# Patient Record
Sex: FEMALE | Race: WHITE | NOT HISPANIC OR LATINO | Employment: UNEMPLOYED | URBAN - METROPOLITAN AREA
[De-identification: names, ages, dates, MRNs, and addresses within clinical notes are randomized per-mention and may not be internally consistent; named-entity substitution may affect disease eponyms.]

---

## 2017-09-28 ENCOUNTER — CONVERSION ENCOUNTER (OUTPATIENT)
Dept: MAMMOGRAPHY | Facility: HOSPITAL | Age: 57
End: 2017-09-28

## 2018-11-16 ENCOUNTER — CONVERSION ENCOUNTER (OUTPATIENT)
Dept: MAMMOGRAPHY | Facility: HOSPITAL | Age: 58
End: 2018-11-16

## 2019-01-02 ENCOUNTER — HOSPITAL ENCOUNTER (OUTPATIENT)
Dept: FAMILY MEDICINE CLINIC | Facility: CLINIC | Age: 59
Discharge: HOME | End: 2019-01-02
Attending: FAMILY MEDICINE

## 2019-01-02 LAB
ALBUMIN SERPL-MCNC: 3.8 G/DL (ref 3.5–5)
ALBUMIN/GLOB SERPL: 1.3 {RATIO} (ref 1.4–2.6)
ALP SERPL-CCNC: 47 U/L (ref 53–141)
ALT SERPL-CCNC: 16 U/L (ref 10–40)
ANION GAP SERPL CALC-SCNC: 16 MMOL/L (ref 8–19)
AST SERPL-CCNC: 17 U/L (ref 15–50)
BILIRUB SERPL-MCNC: 0.23 MG/DL (ref 0.2–1.3)
BUN SERPL-MCNC: 11 MG/DL (ref 5–25)
BUN/CREAT SERPL: 18 {RATIO} (ref 6–20)
CALCIUM SERPL-MCNC: 8.9 MG/DL (ref 8.7–10.4)
CHLORIDE SERPL-SCNC: 99 MMOL/L (ref 99–111)
CONV CO2: 23 MMOL/L (ref 22–32)
CONV TOTAL PROTEIN: 6.8 G/DL (ref 6.3–8.2)
CREAT UR-MCNC: 0.61 MG/DL (ref 0.5–0.9)
GFR SERPLBLD BASED ON 1.73 SQ M-ARVRAT: >60 ML/MIN/{1.73_M2}
GLOBULIN UR ELPH-MCNC: 3 G/DL (ref 2–3.5)
GLUCOSE SERPL-MCNC: 65 MG/DL (ref 65–99)
OSMOLALITY SERPL CALC.SUM OF ELEC: 274 MOSM/KG (ref 273–304)
POTASSIUM SERPL-SCNC: 5.2 MMOL/L (ref 3.5–5.3)
SODIUM SERPL-SCNC: 133 MMOL/L (ref 135–147)

## 2019-02-21 ENCOUNTER — HOSPITAL ENCOUNTER (OUTPATIENT)
Dept: OTHER | Facility: HOSPITAL | Age: 59
Discharge: HOME | End: 2019-02-21
Attending: ORTHOPAEDIC SURGERY

## 2019-02-21 LAB
ALBUMIN SERPL-MCNC: 3.9 G/DL (ref 3.5–5)
ALBUMIN/GLOB SERPL: 1.3 {RATIO} (ref 1.4–2.6)
ALP SERPL-CCNC: 47 U/L (ref 53–141)
ALT SERPL-CCNC: 13 U/L (ref 10–40)
ANION GAP SERPL CALC-SCNC: 19 MMOL/L (ref 8–19)
AST SERPL-CCNC: 16 U/L (ref 15–50)
BASOPHILS # BLD AUTO: 0.02 10*3/UL (ref 0–0.2)
BASOPHILS NFR BLD AUTO: 0.4 % (ref 0–3)
BILIRUB SERPL-MCNC: 0.3 MG/DL (ref 0.2–1.3)
BUN SERPL-MCNC: 12 MG/DL (ref 5–25)
BUN/CREAT SERPL: 17 {RATIO} (ref 6–20)
CALCIUM SERPL-MCNC: 9.1 MG/DL (ref 8.7–10.4)
CHLORIDE SERPL-SCNC: 98 MMOL/L (ref 99–111)
CONV ABS IMM GRAN: 0.01 10*3/UL (ref 0–0.2)
CONV CO2: 20 MMOL/L (ref 22–32)
CONV IMMATURE GRAN: 0.2 % (ref 0–1.8)
CONV TOTAL PROTEIN: 7 G/DL (ref 6.3–8.2)
CREAT UR-MCNC: 0.69 MG/DL (ref 0.5–0.9)
DEPRECATED RDW RBC AUTO: 57 FL (ref 36.4–46.3)
EOSINOPHIL # BLD AUTO: 0.31 10*3/UL (ref 0–0.7)
EOSINOPHIL # BLD AUTO: 6 % (ref 0–7)
ERYTHROCYTE [DISTWIDTH] IN BLOOD BY AUTOMATED COUNT: 15.6 % (ref 11.7–14.4)
GFR SERPLBLD BASED ON 1.73 SQ M-ARVRAT: >60 ML/MIN/{1.73_M2}
GLOBULIN UR ELPH-MCNC: 3.1 G/DL (ref 2–3.5)
GLUCOSE SERPL-MCNC: 133 MG/DL (ref 65–99)
HBA1C MFR BLD: 13.9 G/DL (ref 12–16)
HCT VFR BLD AUTO: 42 % (ref 37–47)
LYMPHOCYTES # BLD AUTO: 1.71 10*3/UL (ref 1–5)
MCH RBC QN AUTO: 32.6 PG (ref 27–31)
MCHC RBC AUTO-ENTMCNC: 33.1 G/DL (ref 33–37)
MCV RBC AUTO: 98.6 FL (ref 81–99)
MONOCYTES # BLD AUTO: 0.67 10*3/UL (ref 0.2–1.2)
MONOCYTES NFR BLD AUTO: 13 % (ref 3–10)
NEUTROPHILS # BLD AUTO: 2.44 10*3/UL (ref 2–8)
NEUTROPHILS NFR BLD AUTO: 47.3 % (ref 30–85)
NRBC CBCN: 0 % (ref 0–0.7)
OSMOLALITY SERPL CALC.SUM OF ELEC: 276 MOSM/KG (ref 273–304)
PLATELET # BLD AUTO: 286 10*3/UL (ref 130–400)
PMV BLD AUTO: 10.2 FL (ref 9.4–12.3)
POTASSIUM SERPL-SCNC: 5.2 MMOL/L (ref 3.5–5.3)
RBC # BLD AUTO: 4.26 10*6/UL (ref 4.2–5.4)
SODIUM SERPL-SCNC: 132 MMOL/L (ref 135–147)
VARIANT LYMPHS NFR BLD MANUAL: 33.1 % (ref 20–45)
WBC # BLD AUTO: 5.16 10*3/UL (ref 4.8–10.8)

## 2019-03-25 ENCOUNTER — HOSPITAL ENCOUNTER (OUTPATIENT)
Dept: OTHER | Facility: HOSPITAL | Age: 59
Discharge: HOME | End: 2019-03-25
Attending: OBSTETRICS & GYNECOLOGY

## 2019-03-25 LAB
BASOPHILS # BLD AUTO: 0.04 10*3/UL (ref 0–0.2)
BASOPHILS NFR BLD AUTO: 0.9 % (ref 0–3)
CONV ABS IMM GRAN: 0.01 10*3/UL (ref 0–0.2)
CONV IMMATURE GRAN: 0.2 % (ref 0–1.8)
DEPRECATED RDW RBC AUTO: 50.4 FL (ref 36.4–46.3)
EOSINOPHIL # BLD AUTO: 0.32 10*3/UL (ref 0–0.7)
EOSINOPHIL # BLD AUTO: 7.2 % (ref 0–7)
ERYTHROCYTE [DISTWIDTH] IN BLOOD BY AUTOMATED COUNT: 14.1 % (ref 11.7–14.4)
FSH SERPL-ACNC: 0.1 M[IU]/ML
HBA1C MFR BLD: 13.8 G/DL (ref 12–16)
HCT VFR BLD AUTO: 41.3 % (ref 37–47)
LH SERPL-ACNC: 0.1 M[IU]/ML
LYMPHOCYTES # BLD AUTO: 1.33 10*3/UL (ref 1–5)
MCH RBC QN AUTO: 32.5 PG (ref 27–31)
MCHC RBC AUTO-ENTMCNC: 33.4 G/DL (ref 33–37)
MCV RBC AUTO: 97.4 FL (ref 81–99)
MONOCYTES # BLD AUTO: 0.67 10*3/UL (ref 0.2–1.2)
MONOCYTES NFR BLD AUTO: 15.1 % (ref 3–10)
NEUTROPHILS # BLD AUTO: 2.07 10*3/UL (ref 2–8)
NEUTROPHILS NFR BLD AUTO: 46.6 % (ref 30–85)
NRBC CBCN: 0 % (ref 0–0.7)
PLATELET # BLD AUTO: 267 10*3/UL (ref 130–400)
PMV BLD AUTO: 9.8 FL (ref 9.4–12.3)
RBC # BLD AUTO: 4.24 10*6/UL (ref 4.2–5.4)
T4 FREE SERPL-MCNC: 1.4 NG/DL (ref 0.9–1.8)
TSH SERPL-ACNC: 1.1 M[IU]/L (ref 0.27–4.2)
VARIANT LYMPHS NFR BLD MANUAL: 30 % (ref 20–45)
WBC # BLD AUTO: 4.44 10*3/UL (ref 4.8–10.8)

## 2019-07-26 ENCOUNTER — HOSPITAL ENCOUNTER (OUTPATIENT)
Dept: OTHER | Facility: HOSPITAL | Age: 59
Discharge: HOME | End: 2019-07-26
Attending: OBSTETRICS & GYNECOLOGY

## 2019-07-26 LAB
APPEARANCE UR: CLEAR
BILIRUB UR QL: NEGATIVE
COLOR UR: YELLOW
CONV COLLECTION SOURCE (UA): NORMAL
CONV UROBILINOGEN IN URINE BY AUTOMATED TEST STRIP: 0.2 {EHRLICHU}/DL (ref 0.1–1)
GLUCOSE UR QL: NEGATIVE MG/DL
HGB UR QL STRIP: NEGATIVE
KETONES UR QL STRIP: NEGATIVE MG/DL
LEUKOCYTE ESTERASE UR QL STRIP: NEGATIVE
NITRITE UR QL STRIP: NEGATIVE
PH UR STRIP.AUTO: 5 [PH] (ref 5–8)
PROT UR QL: NEGATIVE MG/DL
SP GR UR: 1.01 (ref 1–1.03)

## 2019-07-29 LAB
AMOXICILLIN+CLAV SUSC ISLT: 8
AMPICILLIN SUSC ISLT: >=32
AMPICILLIN+SULBAC SUSC ISLT: >=32
BACTERIA UR CULT: ABNORMAL
CEFAZOLIN SUSC ISLT: <=4
CEFEPIME SUSC ISLT: <=1
CEFTAZIDIME SUSC ISLT: <=1
CEFTRIAXONE SUSC ISLT: <=1
CEFUROXIME ORAL SUSC ISLT: 4
CEFUROXIME PARENTER SUSC ISLT: 4
CIPROFLOXACIN SUSC ISLT: <=0.25
ERTAPENEM SUSC ISLT: <=0.5
GENTAMICIN SUSC ISLT: >=16
LEVOFLOXACIN SUSC ISLT: <=0.12
NITROFURANTOIN SUSC ISLT: <=16
TETRACYCLINE SUSC ISLT: <=1
TMP SMX SUSC ISLT: >=320
TOBRAMYCIN SUSC ISLT: 2

## 2019-10-31 ENCOUNTER — HOSPITAL ENCOUNTER (OUTPATIENT)
Dept: FAMILY MEDICINE CLINIC | Facility: CLINIC | Age: 59
Discharge: HOME | End: 2019-10-31
Attending: NURSE PRACTITIONER

## 2019-10-31 ENCOUNTER — OFFICE VISIT CONVERTED (OUTPATIENT)
Dept: FAMILY MEDICINE CLINIC | Facility: CLINIC | Age: 59
End: 2019-10-31
Attending: NURSE PRACTITIONER

## 2019-10-31 ENCOUNTER — CONVERSION ENCOUNTER (OUTPATIENT)
Dept: FAMILY MEDICINE CLINIC | Facility: CLINIC | Age: 59
End: 2019-10-31

## 2019-10-31 ENCOUNTER — HOSPITAL ENCOUNTER (OUTPATIENT)
Dept: GENERAL RADIOLOGY | Facility: HOSPITAL | Age: 59
Discharge: HOME | End: 2019-10-31
Attending: NURSE PRACTITIONER

## 2019-10-31 LAB
ALBUMIN SERPL-MCNC: 4 G/DL (ref 3.5–5)
ALBUMIN/GLOB SERPL: 1.2 {RATIO} (ref 1.4–2.6)
ALP SERPL-CCNC: 48 U/L (ref 53–141)
ALT SERPL-CCNC: 15 U/L (ref 10–40)
AMYLASE SERPL-CCNC: 67 U/L (ref 30–110)
ANION GAP SERPL CALC-SCNC: 16 MMOL/L (ref 8–19)
AST SERPL-CCNC: 19 U/L (ref 15–50)
BASOPHILS # BLD AUTO: 0.03 10*3/UL (ref 0–0.2)
BASOPHILS NFR BLD AUTO: 0.4 % (ref 0–3)
BILIRUB SERPL-MCNC: 0.34 MG/DL (ref 0.2–1.3)
BUN SERPL-MCNC: 8 MG/DL (ref 5–25)
BUN/CREAT SERPL: 12 {RATIO} (ref 6–20)
CALCIUM SERPL-MCNC: 9 MG/DL (ref 8.7–10.4)
CHLORIDE SERPL-SCNC: 93 MMOL/L (ref 99–111)
CONV ABS IMM GRAN: 0.02 10*3/UL (ref 0–0.2)
CONV CO2: 24 MMOL/L (ref 22–32)
CONV IMMATURE GRAN: 0.2 % (ref 0–1.8)
CONV TOTAL PROTEIN: 7.3 G/DL (ref 6.3–8.2)
CREAT UR-MCNC: 0.65 MG/DL (ref 0.5–0.9)
DEPRECATED RDW RBC AUTO: 50.7 FL (ref 36.4–46.3)
EOSINOPHIL # BLD AUTO: 0.12 10*3/UL (ref 0–0.7)
EOSINOPHIL # BLD AUTO: 1.5 % (ref 0–7)
ERYTHROCYTE [DISTWIDTH] IN BLOOD BY AUTOMATED COUNT: 13.3 % (ref 11.7–14.4)
GFR SERPLBLD BASED ON 1.73 SQ M-ARVRAT: >60 ML/MIN/{1.73_M2}
GLOBULIN UR ELPH-MCNC: 3.3 G/DL (ref 2–3.5)
GLUCOSE SERPL-MCNC: 82 MG/DL (ref 65–99)
HCT VFR BLD AUTO: 44.5 % (ref 37–47)
HGB BLD-MCNC: 14.8 G/DL (ref 12–16)
LIPASE SERPL-CCNC: 28 U/L (ref 5–51)
LYMPHOCYTES # BLD AUTO: 2.08 10*3/UL (ref 1–5)
LYMPHOCYTES NFR BLD AUTO: 25.9 % (ref 20–45)
MCH RBC QN AUTO: 33.6 PG (ref 27–31)
MCHC RBC AUTO-ENTMCNC: 33.3 G/DL (ref 33–37)
MCV RBC AUTO: 100.9 FL (ref 81–99)
MONOCYTES # BLD AUTO: 0.77 10*3/UL (ref 0.2–1.2)
MONOCYTES NFR BLD AUTO: 9.6 % (ref 3–10)
NEUTROPHILS # BLD AUTO: 5 10*3/UL (ref 2–8)
NEUTROPHILS NFR BLD AUTO: 62.4 % (ref 30–85)
NRBC CBCN: 0 % (ref 0–0.7)
OSMOLALITY SERPL CALC.SUM OF ELEC: 265 MOSM/KG (ref 273–304)
PLATELET # BLD AUTO: 310 10*3/UL (ref 130–400)
PMV BLD AUTO: 10.8 FL (ref 9.4–12.3)
POTASSIUM SERPL-SCNC: 4 MMOL/L (ref 3.5–5.3)
RBC # BLD AUTO: 4.41 10*6/UL (ref 4.2–5.4)
SODIUM SERPL-SCNC: 129 MMOL/L (ref 135–147)
WBC # BLD AUTO: 8.02 10*3/UL (ref 4.8–10.8)

## 2019-11-01 ENCOUNTER — HOSPITAL ENCOUNTER (OUTPATIENT)
Dept: GENERAL RADIOLOGY | Facility: HOSPITAL | Age: 59
Discharge: HOME | End: 2019-11-01
Attending: NURSE PRACTITIONER

## 2019-12-04 ENCOUNTER — HOSPITAL ENCOUNTER (OUTPATIENT)
Dept: GENERAL RADIOLOGY | Facility: HOSPITAL | Age: 59
Discharge: HOME | End: 2019-12-04
Attending: NURSE PRACTITIONER

## 2019-12-17 ENCOUNTER — HOSPITAL ENCOUNTER (OUTPATIENT)
Dept: PERIOP | Facility: HOSPITAL | Age: 59
Setting detail: HOSPITAL OUTPATIENT SURGERY
Discharge: HOME | End: 2019-12-18
Attending: OBSTETRICS & GYNECOLOGY

## 2019-12-17 LAB
BASOPHILS # BLD AUTO: 0.04 10*3/UL (ref 0–0.2)
BASOPHILS NFR BLD AUTO: 0.8 % (ref 0–3)
CONV ABS IMM GRAN: 0.01 10*3/UL (ref 0–0.2)
CONV IMMATURE GRAN: 0.2 % (ref 0–1.8)
DEPRECATED RDW RBC AUTO: 49.7 FL (ref 36.4–46.3)
EOSINOPHIL # BLD AUTO: 0.18 10*3/UL (ref 0–0.7)
EOSINOPHIL # BLD AUTO: 3.8 % (ref 0–7)
ERYTHROCYTE [DISTWIDTH] IN BLOOD BY AUTOMATED COUNT: 13.6 % (ref 11.7–14.4)
HCT VFR BLD AUTO: 44 % (ref 37–47)
HGB BLD-MCNC: 15.1 G/DL (ref 12–16)
LYMPHOCYTES # BLD AUTO: 1.52 10*3/UL (ref 1–5)
LYMPHOCYTES NFR BLD AUTO: 31.9 % (ref 20–45)
MCH RBC QN AUTO: 33.7 PG (ref 27–31)
MCHC RBC AUTO-ENTMCNC: 34.3 G/DL (ref 33–37)
MCV RBC AUTO: 98.2 FL (ref 81–99)
MONOCYTES # BLD AUTO: 0.59 10*3/UL (ref 0.2–1.2)
MONOCYTES NFR BLD AUTO: 12.4 % (ref 3–10)
NEUTROPHILS # BLD AUTO: 2.42 10*3/UL (ref 2–8)
NEUTROPHILS NFR BLD AUTO: 50.9 % (ref 30–85)
NRBC CBCN: 0 % (ref 0–0.7)
PLATELET # BLD AUTO: 281 10*3/UL (ref 130–400)
PMV BLD AUTO: 9.5 FL (ref 9.4–12.3)
RBC # BLD AUTO: 4.48 10*6/UL (ref 4.2–5.4)
WBC # BLD AUTO: 4.76 10*3/UL (ref 4.8–10.8)

## 2019-12-18 LAB
BASOPHILS # BLD AUTO: 0.05 10*3/UL (ref 0–0.2)
BASOPHILS NFR BLD AUTO: 0.5 % (ref 0–3)
CONV ABS IMM GRAN: 0.05 10*3/UL (ref 0–0.2)
CONV IMMATURE GRAN: 0.5 % (ref 0–1.8)
DEPRECATED RDW RBC AUTO: 48.9 FL (ref 36.4–46.3)
EOSINOPHIL # BLD AUTO: 0.2 10*3/UL (ref 0–0.7)
EOSINOPHIL # BLD AUTO: 2 % (ref 0–7)
ERYTHROCYTE [DISTWIDTH] IN BLOOD BY AUTOMATED COUNT: 13.6 % (ref 11.7–14.4)
HCT VFR BLD AUTO: 37.1 % (ref 37–47)
HGB BLD-MCNC: 12.8 G/DL (ref 12–16)
LYMPHOCYTES # BLD AUTO: 2.29 10*3/UL (ref 1–5)
LYMPHOCYTES NFR BLD AUTO: 22.4 % (ref 20–45)
MCH RBC QN AUTO: 33.7 PG (ref 27–31)
MCHC RBC AUTO-ENTMCNC: 34.5 G/DL (ref 33–37)
MCV RBC AUTO: 97.6 FL (ref 81–99)
MONOCYTES # BLD AUTO: 1.14 10*3/UL (ref 0.2–1.2)
MONOCYTES NFR BLD AUTO: 11.2 % (ref 3–10)
NEUTROPHILS # BLD AUTO: 6.48 10*3/UL (ref 2–8)
NEUTROPHILS NFR BLD AUTO: 63.4 % (ref 30–85)
NRBC CBCN: 0 % (ref 0–0.7)
PLATELET # BLD AUTO: 253 10*3/UL (ref 130–400)
PMV BLD AUTO: 9.4 FL (ref 9.4–12.3)
RBC # BLD AUTO: 3.8 10*6/UL (ref 4.2–5.4)
WBC # BLD AUTO: 10.21 10*3/UL (ref 4.8–10.8)

## 2020-01-21 ENCOUNTER — OFFICE VISIT CONVERTED (OUTPATIENT)
Dept: FAMILY MEDICINE CLINIC | Facility: CLINIC | Age: 60
End: 2020-01-21
Attending: NURSE PRACTITIONER

## 2020-01-24 ENCOUNTER — OFFICE VISIT CONVERTED (OUTPATIENT)
Dept: FAMILY MEDICINE CLINIC | Facility: CLINIC | Age: 60
End: 2020-01-24
Attending: NURSE PRACTITIONER

## 2020-06-10 ENCOUNTER — TELEPHONE CONVERTED (OUTPATIENT)
Dept: GASTROENTEROLOGY | Facility: CLINIC | Age: 60
End: 2020-06-10
Attending: PHYSICIAN ASSISTANT

## 2020-07-22 ENCOUNTER — OFFICE VISIT CONVERTED (OUTPATIENT)
Dept: GASTROENTEROLOGY | Facility: CLINIC | Age: 60
End: 2020-07-22
Attending: PHYSICIAN ASSISTANT

## 2020-07-24 ENCOUNTER — HOSPITAL ENCOUNTER (OUTPATIENT)
Dept: FAMILY MEDICINE CLINIC | Facility: CLINIC | Age: 60
Discharge: HOME | End: 2020-07-24
Attending: FAMILY MEDICINE

## 2020-07-24 LAB
25(OH)D3 SERPL-MCNC: 28.2 NG/ML (ref 30–100)
ALBUMIN SERPL-MCNC: 3.8 G/DL (ref 3.5–5)
ALBUMIN/GLOB SERPL: 1.2 {RATIO} (ref 1.4–2.6)
ALP SERPL-CCNC: 78 U/L (ref 53–141)
ALT SERPL-CCNC: 20 U/L (ref 10–40)
AMYLASE SERPL-CCNC: 64 U/L (ref 30–110)
ANION GAP SERPL CALC-SCNC: 16 MMOL/L (ref 8–19)
AST SERPL-CCNC: 20 U/L (ref 15–50)
BASOPHILS # BLD AUTO: 0.03 10*3/UL (ref 0–0.2)
BASOPHILS NFR BLD AUTO: 0.7 % (ref 0–3)
BILIRUB SERPL-MCNC: 0.51 MG/DL (ref 0.2–1.3)
BUN SERPL-MCNC: 14 MG/DL (ref 5–25)
BUN/CREAT SERPL: 24 {RATIO} (ref 6–20)
CALCIUM SERPL-MCNC: 9.5 MG/DL (ref 8.7–10.4)
CHLORIDE SERPL-SCNC: 99 MMOL/L (ref 99–111)
CONV ABS IMM GRAN: 0.01 10*3/UL (ref 0–0.2)
CONV CO2: 26 MMOL/L (ref 22–32)
CONV IMMATURE GRAN: 0.2 % (ref 0–1.8)
CONV TOTAL PROTEIN: 7 G/DL (ref 6.3–8.2)
CREAT UR-MCNC: 0.59 MG/DL (ref 0.5–0.9)
DEPRECATED RDW RBC AUTO: 50.3 FL (ref 36.4–46.3)
EOSINOPHIL # BLD AUTO: 0.2 10*3/UL (ref 0–0.7)
EOSINOPHIL # BLD AUTO: 4.7 % (ref 0–7)
ERYTHROCYTE [DISTWIDTH] IN BLOOD BY AUTOMATED COUNT: 13.2 % (ref 11.7–14.4)
GFR SERPLBLD BASED ON 1.73 SQ M-ARVRAT: >60 ML/MIN/{1.73_M2}
GLOBULIN UR ELPH-MCNC: 3.2 G/DL (ref 2–3.5)
GLUCOSE SERPL-MCNC: 75 MG/DL (ref 65–99)
HCT VFR BLD AUTO: 45 % (ref 37–47)
HGB BLD-MCNC: 14.4 G/DL (ref 12–16)
LIPASE SERPL-CCNC: 19 U/L (ref 5–51)
LYMPHOCYTES # BLD AUTO: 1.39 10*3/UL (ref 1–5)
LYMPHOCYTES NFR BLD AUTO: 32.5 % (ref 20–45)
MCH RBC QN AUTO: 32.7 PG (ref 27–31)
MCHC RBC AUTO-ENTMCNC: 32 G/DL (ref 33–37)
MCV RBC AUTO: 102.3 FL (ref 81–99)
MONOCYTES # BLD AUTO: 0.61 10*3/UL (ref 0.2–1.2)
MONOCYTES NFR BLD AUTO: 14.3 % (ref 3–10)
NEUTROPHILS # BLD AUTO: 2.04 10*3/UL (ref 2–8)
NEUTROPHILS NFR BLD AUTO: 47.6 % (ref 30–85)
NRBC CBCN: 0 % (ref 0–0.7)
OSMOLALITY SERPL CALC.SUM OF ELEC: 281 MOSM/KG (ref 273–304)
PLATELET # BLD AUTO: 247 10*3/UL (ref 130–400)
PMV BLD AUTO: 11.4 FL (ref 9.4–12.3)
POTASSIUM SERPL-SCNC: 5 MMOL/L (ref 3.5–5.3)
RBC # BLD AUTO: 4.4 10*6/UL (ref 4.2–5.4)
SODIUM SERPL-SCNC: 136 MMOL/L (ref 135–147)
T4 FREE SERPL-MCNC: 1.1 NG/DL (ref 0.9–1.8)
TSH SERPL-ACNC: 1.14 M[IU]/L (ref 0.27–4.2)
WBC # BLD AUTO: 4.28 10*3/UL (ref 4.8–10.8)

## 2020-08-06 ENCOUNTER — HOSPITAL ENCOUNTER (OUTPATIENT)
Dept: GENERAL RADIOLOGY | Facility: HOSPITAL | Age: 60
Discharge: HOME | End: 2020-08-06
Attending: FAMILY MEDICINE

## 2020-08-31 ENCOUNTER — HOSPITAL ENCOUNTER (OUTPATIENT)
Dept: OTHER | Facility: HOSPITAL | Age: 60
Discharge: HOME | End: 2020-08-31
Attending: OBSTETRICS & GYNECOLOGY

## 2020-09-02 LAB
BACTERIA SPEC AEROBE CULT: NORMAL
BACTERIA UR CULT: NORMAL

## 2020-10-30 ENCOUNTER — OFFICE VISIT CONVERTED (OUTPATIENT)
Dept: GASTROENTEROLOGY | Facility: CLINIC | Age: 60
End: 2020-10-30
Attending: NURSE PRACTITIONER

## 2021-01-22 ENCOUNTER — HOSPITAL ENCOUNTER (OUTPATIENT)
Dept: FAMILY MEDICINE CLINIC | Facility: CLINIC | Age: 61
Discharge: HOME | End: 2021-01-22
Attending: FAMILY MEDICINE

## 2021-01-23 LAB — 25(OH)D3 SERPL-MCNC: 59.9 NG/ML (ref 30–100)

## 2021-01-29 ENCOUNTER — HOSPITAL ENCOUNTER (OUTPATIENT)
Dept: GASTROENTEROLOGY | Facility: HOSPITAL | Age: 61
Setting detail: HOSPITAL OUTPATIENT SURGERY
Discharge: HOME | End: 2021-01-29
Attending: INTERNAL MEDICINE

## 2021-02-18 ENCOUNTER — HOSPITAL ENCOUNTER (OUTPATIENT)
Dept: MAMMOGRAPHY | Facility: HOSPITAL | Age: 61
Discharge: HOME | End: 2021-02-18
Attending: NURSE PRACTITIONER

## 2021-03-29 ENCOUNTER — CONVERSION ENCOUNTER (OUTPATIENT)
Dept: GASTROENTEROLOGY | Facility: CLINIC | Age: 61
End: 2021-03-29

## 2021-03-29 ENCOUNTER — OFFICE VISIT CONVERTED (OUTPATIENT)
Dept: GASTROENTEROLOGY | Facility: CLINIC | Age: 61
End: 2021-03-29
Attending: NURSE PRACTITIONER

## 2021-04-23 ENCOUNTER — HOSPITAL ENCOUNTER (OUTPATIENT)
Dept: CT IMAGING | Facility: HOSPITAL | Age: 61
Discharge: HOME | End: 2021-04-23
Attending: NURSE PRACTITIONER

## 2021-04-23 ENCOUNTER — OFFICE VISIT CONVERTED (OUTPATIENT)
Dept: GASTROENTEROLOGY | Facility: CLINIC | Age: 61
End: 2021-04-23
Attending: NURSE PRACTITIONER

## 2021-04-23 LAB
CREAT BLD-MCNC: 0.5 MG/DL (ref 0.6–1.4)
GFR SERPLBLD BASED ON 1.73 SQ M-ARVRAT: >60 ML/MIN/{1.73_M2}

## 2021-05-13 NOTE — PROGRESS NOTES
Progress Note      Patient Name: Yelitza Bray   Patient ID: [id number]   Sex: Female   YOB: 1960    Primary Care Provider: Db Williamson MD   Referring Provider: Charles De Luna MD    Visit Date: Nirlai 10, 2020    Provider: Wojciech Ag PA-C   Location: [facility]   Location Address: [address] [place] [address]  [id number]   Location Phone: [phone]          Chief Complaint     constipation, ibs       History Of Present Illness  TELEHEALTH TELEPHONE VISIT  Chief Complaint: constipation   Yelitza Bray is a 60 year old /White female who is presenting for evaluation via telehealth telephone visit. Verbal consent obtained before beginning visit.   Provider spent 21 minutes with the patient during the telehealth visit.   The following staff were present during this visit: Shola Erwin MA   Past Medical History/ Overview of Patient Symptoms     60 year old female with a history of GERD, constipation, and IBS agrees to a telephone visit.  She had a recent CT scan A/P showing fibroids and marked constipation. She was started on motegrity 2mg, but it causes diarrhea.  She has to wear pads on certain days and alternates constipation.  She is no longer on motegrity.    She had a normal colonoscopy by Dr. Renee 11/2017.       Past Medical History  Allergic rhinitis, chronic; Constipation; Cystitis (Chronic); GERD; Hematuria; Limb Swelling; Mitral Valve Disorders; Reflux; Urinary tract infection; UTI         Past Surgical History  breast reduction; Cholecystectomy; Colonoscopy; endoscopy; Plastic surgery; Salpingectomy         Medication List  cefdinir 300 mg oral capsule; Celexa 10 mg oral tablet; fluticasone propionate 50 mcg/actuation nasal spray,suspension; Medrol (Pee) 4 mg oral tablets,dose pack; metoprolol succinate 25 mg Oral Tablet Extended Release 24 hr; Minivelle 0.025 mg/24 hr transdermal patch semiweekly; norethindrone acetate 5 mg oral tablet; Probiotic 3  billion cell oral capsule; Tessalon Perles 100 mg oral capsule; Zyrtec 10 mg oral tablet         Allergy List  Phenergan; SULFA (SULFONAMIDES)       Allergies Reconciled  Family Medical History  Stroke; Heart Disease; Diabetes, unspecified type; - No Family History of Colorectal Cancer; Family history of certain chronic disabling diseases; arthritis         Social History  Alcohol (Never); Alcohol Use (Never); Caffeine (Current every day); lives with spouse; .; Recreational Drug Use (Never); Second hand smoke exposure (Never); Tobacco (Former); Working         Review of Systems  · Constitutional  o Denies  o : fatigue, night sweats  · Eyes  o Denies  o : double vision, blurred vision  · HENT  o Denies  o : vertigo, recent head injury  · Breasts  o Denies  o : abnormal changes in breast size, additional breast symptoms except as noted in the HPI  · Cardiovascular  o Denies  o : chest pain, irregular heart beats  · Respiratory  o Denies  o : shortness of breath, productive cough  · Gastrointestinal  o Denies  o : nausea, vomiting  · Genitourinary  o Denies  o : dysuria, urinary retention  · Integument  o Denies  o : hair growth change, new skin lesions  · Neurologic  o Denies  o : altered mental status, seizures  · Musculoskeletal  o Denies  o : joint swelling, limitation of motion  · Endocrine  o Denies  o : cold intolerance, heat intolerance  · Heme-Lymph  o Denies  o : petechiae, lymph node enlargement or tenderness  · Allergic-Immunologic  o Denies  o : frequent illnesses          Assessment  · GERD (gastroesophageal reflux disease)     530.81/K21.9  · IBS (irritable bowel syndrome)     564.1/K58.9      Plan  · Orders  o Physician Telephone Evaluation, 21-30 minutes (21077) - 530.81/K21.9, 564.1/K58.9 - 06/10/2020  · Medications  o dicyclomine 20 mg oral tablet   SIG: take 1 tablet (20 mg) by oral route 4 times per day prn   DISP: (120) tablets with 3 refills  Prescribed on 06/10/2020     o Medications have  been Reconciled  o Transition of Care or Provider Policy  · Instructions  o Plan Of Care: 60 year old female with IBS-M. I recommended a daily trial of fiber. I will add a trial of bentyl 20mg QID prn. She will follow up in 6 weeks  o Chronic conditions reviewed and taken into consideration for today's treatment plan.            Electronically Signed by: Wojciech Ag PA-C -Author on Nirali 10, 2020 09:29:02 AM  Electronically Co-signed by: Lobo Renee MD -Reviewer on June 18, 2020 09:24:30 PM

## 2021-05-13 NOTE — PROGRESS NOTES
Progress Note      Patient Name: Yelitza Bray   Patient ID: [id number]   Sex: Female   YOB: 1960    Primary Care Provider: Db Williamson MD    Visit Date: October 30, 2020    Provider: ZACK Brandon   Location: [facility]   Location Address: [address] [place] [address] [facility]  [address]  [place], [place]  [id number]          Chief Complaint  · IBS  · GERD      History Of Present Illness     60-year-old female with a history of GERD and IBS-M. At last office visit, she complained of alternating between constipation and diarrhea.  She had previously been on Motegrity, but it caused diarrhea.  She had been given a trial of Bentyl, which can exacerbate constipation. She was given a trial of Levsin once daily at last office visit to see if that will help improve her symptoms.  She reports that she did try the NuLev, but it made her constipated.  She reports that that she does have a bowel movement every day, but it is pellets.  She feels that she is not completely emptying her bowels.  She reports she is taking 1 tablespoon of Metamucil a day and MiraLAX as needed.  She had tried Amitiza years ago, which had worked for her at first, but it was no longer effective for her.  She does take dicyclomine as needed for abdominal cramping and pain.  She denies rectal bleeding, abdominal pain, and vomiting.  She will have occasional nausea.  Her acid reflux is being well controlled with Pepcid 20 mg twice daily.  Review of her colonoscopy by Dr. Pascual 11/2017 was unremarkable.    Labs 07/24/2020: Hemoglobin 14.4, hematocrit 45, platelets 247, AST 20, ALT 20, lipase 19, total bili 0.51, alk phos, creatinine 0.59, GFR greater than 60         Past Medical History  Allergic rhinitis, chronic; Constipation; Cystitis (Chronic); GERD; Hematuria; Limb Swelling; Mitral Valve Disorders; Reflux; Urinary Tract Infection; UTI         Past Surgical History  breast reduction; Cholecystectomy; Colonoscopy;  endoscopy; Plastic surgery; Salpingectomy         Medication List  biotin 5,000 mcg oral tablet,disintegrating; Celexa 10 mg oral tablet; dicyclomine 20 mg oral tablet; famotidine 20 mg oral tablet; fluticasone propionate 50 mcg/actuation nasal spray,suspension; metoprolol succinate 25 mg Oral Tablet Extended Release 24 hr; Minivelle 0.025 mg/24 hr transdermal patch semiweekly; NuLev 0.125 mg oral tablet,disintegrating; Probiotic 3 billion cell oral capsule; Vitamin D3 125 mcg (5,000 unit) oral tablet         Allergy List  Phenergan; SULFA (SULFONAMIDES)       Allergies Reconciled  Family Medical History  Stroke; Heart Disease; Diabetes, unspecified type; - No Family History of Colorectal Cancer; Family history of certain chronic disabling diseases; arthritis         Social History  Alcohol (Never); Caffeine (Current every day); lives with spouse; ; Recreational Drug Use (Never); Second hand smoke exposure (Never); Tobacco (Former); Working         Review of Systems  · Constitutional  o Denies  o : chills, fever  · Cardiovascular  o Denies  o : chest pain  · Respiratory  o Denies  o : shortness of breath  · Gastrointestinal  o Admits  o : nausea  o Denies  o : vomiting, dysphagia  · Endocrine  o Denies  o : weight gain, weight loss      Vitals  Date Time BP Position Site L\R Cuff Size HR RR TEMP (F) WT  HT  BMI kg/m2 BSA m2 O2 Sat FR L/min FiO2 HC       10/30/2020 08:40 /85 Sitting     53 97.8 123lbs 8oz 5'   24.12 1.54             Physical Examination  · Constitutional  o Appearance  o : Healthy-appearing, awake and alert in no acute distress  · Head and Face  o Head  o : Normocephalic with no worriesome skin lesions  · Eyes  o Vision  o :   § Visual Fields  § : eyes move symmetrical in all directions  o Sclerae  o : sclerae anicteric  · Neck  o Inspection/Palpation  o : Trachea is midline, no adenopathy  · Respiratory  o Respiratory Effort  o : Breathing is unlabored.  o Inspection of Chest  o :  normal appearance  o Auscultation of Lungs  o : Chest is clear to auscultation bilaterally.  · Cardiovascular  o Heart  o :   § Auscultation of Heart  § : no murmurs, rubs, or gallops  o Peripheral Vascular System  o :   § Extremities  § : no cyanosis, clubbing or edema;   · Gastrointestinal  o Abdominal Examination  o : Abdomen is soft, nontender to palpation, with normal active bowel sounds, no appreciable hepatosplenomegaly.          Assessment  · Gastroesophageal Reflux     530.81/K21.9  · Irritable Bowel Syndrome     564.1/K58.9      Plan  · Medications  o Linzess 72 mcg oral capsule   SIG: take 1 capsule (72 mcg) by oral route once daily on an empty stomach at least 30 minutes before 1st meal of the day for 30 days   DISP: (30) Capsule with 3 refills  Prescribed on 10/30/2020     o Medications have been Reconciled  o Transition of Care or Provider Policy  · Instructions  o 60-year-old female with a history of GERD and IBS returns for follow-up. She has discontinued her NuLev because it made her constipated. She reports that her constipation has worsened since last visit. She is to continue continue to take Pepcid 20 mg twice daily. I have sent in a prescription of Linzess 72 mcg to see if that will help improve her constipation. I would have her follow-up in 3 months time to reevaluate her symptoms.            Electronically Signed by: ZACK Brandon -Author on October 30, 2020 08:56:19 AM  Electronically Co-signed by: Lobo Renee MD -Reviewer on November 4, 2020 03:51:30 PM

## 2021-05-13 NOTE — PROGRESS NOTES
Progress Note      Patient Name: Yelitza Bray   Patient ID: [id number]   Sex: Female   YOB: 1960    Primary Care Provider: Db Williamson MD    Visit Date: July 22, 2020    Provider: Wojciech Ag PA-C   Location: [facility]   Location Address: [address] [place] [address]  [id number]   Location Phone: [phone]          Chief Complaint  · Follow-up      History Of Present Illness     60-year-old female history of GERD, constipation, and IBS returns for her 6-week follow-up.  She has had a previous colonoscopy by Dr. Renee 11/2017 that was unremarkable.  She is alternating between constipation and diarrhea and previously been on Motegrity which caused diarrhea.  She was given a trial of Bentyl 20 mg take up to 4 times daily as needed.  However, this can cause constipation and has been moderately effective.  She alternates from constipation and diarrhea.         Past Medical History  Allergic rhinitis, chronic; Constipation; Cystitis (Chronic); GERD; Hematuria; Limb Swelling; Mitral Valve Disorders; Reflux; Urinary Tract Infection; UTI         Past Surgical History  breast reduction; Cholecystectomy; Colonoscopy; endoscopy; Plastic surgery; Salpingectomy         Medication List  Celexa 10 mg oral tablet; dicyclomine 20 mg oral tablet; famotidine 20 mg oral tablet; fluticasone propionate 50 mcg/actuation nasal spray,suspension; Medrol (Pee) 4 mg oral tablets,dose pack; metoprolol succinate 25 mg Oral Tablet Extended Release 24 hr; Minivelle 0.025 mg/24 hr transdermal patch semiweekly; norethindrone acetate 5 mg oral tablet; Probiotic 3 billion cell oral capsule; Zyrtec 10 mg oral tablet         Allergy List  Phenergan; SULFA (SULFONAMIDES)         Family Medical History  Stroke; Heart Disease; Diabetes, unspecified type; - No Family History of Colorectal Cancer; Family history of certain chronic disabling diseases; arthritis         Social History  Alcohol (Never); Alcohol Use  (Never); Caffeine (Current every day); lives with spouse; .; Recreational Drug Use (Never); Second hand smoke exposure (Never); Tobacco (Former); Working         Review of Systems  · Constitutional  o Denies  o : chills, fever  · Cardiovascular  o Denies  o : chest pain  · Respiratory  o Denies  o : shortness of breath  · Gastrointestinal  o Denies  o : nausea, vomiting, dysphagia  · Endocrine  o Denies  o : weight gain, weight loss      Vitals  Date Time BP Position Site L\R Cuff Size HR RR TEMP (F) WT  HT  BMI kg/m2 BSA m2 O2 Sat HC       07/22/2020 08:41 /66 Sitting    87 - R   122lbs 1oz    100 %          Physical Examination  · Constitutional  o Appearance  o : Healthy-appearing, awake and alert in no acute distress  · Head and Face  o Head  o : Normocephalic with no worriesome skin lesions  · Eyes  o Vision  o :   § Visual Fields  § : eyes move symmetrical in all directions  o Sclerae  o : sclerae anicteric  · Neck  o Inspection/Palpation  o : Trachea is midline, no adenopathy  · Respiratory  o Respiratory Effort  o : Breathing is unlabored.  o Inspection of Chest  o : normal appearance  o Auscultation of Lungs  o : Chest is clear to auscultation bilaterally.  · Cardiovascular  o Heart  o :   § Auscultation of Heart  § : no murmurs, rubs, or gallops  o Peripheral Vascular System  o :   § Extremities  § : no cyanosis, clubbing or edema;   · Gastrointestinal  o Abdominal Examination  o : Abdomen is soft, nontender to palpation, with normal active bowel sounds, no appreciable hepatosplenomegaly.          Assessment  · GERD (gastroesophageal reflux disease)     530.81/K21.9  · IBS (irritable bowel syndrome)     564.1/K58.9      Plan  · Medications  o NuLev 0.125 mg oral tablet,disintegrating   SIG: take 1 tablet (0.125 mg) by oral route 3 times per day for 30 days   DISP: (90) tablets with 3 refills  Prescribed on 07/22/2020     o Medications have been Reconciled  o Transition of Care or Provider  Policy  · Instructions  o 60-year-old female with IBSM. I recommend she continue a fiber supplement. I will add a trial of Levsin to take once daily and instruct her how to titrate medications. If this is ineffective she will call our office and we will consider a trial of VSL or Xifaxan. She will follow-up in 3 months.            Electronically Signed by: Wojciech Ag PA-C -Author on July 22, 2020 08:55:32 AM  Electronically Co-signed by: Lobo Renee MD -Reviewer on July 22, 2020 05:25:52 PM

## 2021-05-14 VITALS
SYSTOLIC BLOOD PRESSURE: 144 MMHG | TEMPERATURE: 97.8 F | RESPIRATION RATE: 53 BRPM | WEIGHT: 123.5 LBS | HEIGHT: 60 IN | DIASTOLIC BLOOD PRESSURE: 85 MMHG | BODY MASS INDEX: 24.25 KG/M2

## 2021-05-14 VITALS
SYSTOLIC BLOOD PRESSURE: 115 MMHG | WEIGHT: 123 LBS | DIASTOLIC BLOOD PRESSURE: 65 MMHG | BODY MASS INDEX: 24.15 KG/M2 | RESPIRATION RATE: 16 BRPM | HEART RATE: 59 BPM | HEIGHT: 60 IN | OXYGEN SATURATION: 99 %

## 2021-05-14 VITALS
DIASTOLIC BLOOD PRESSURE: 68 MMHG | OXYGEN SATURATION: 99 % | HEIGHT: 60 IN | HEART RATE: 59 BPM | RESPIRATION RATE: 16 BRPM | BODY MASS INDEX: 24.15 KG/M2 | SYSTOLIC BLOOD PRESSURE: 131 MMHG | WEIGHT: 123 LBS

## 2021-05-14 NOTE — PROGRESS NOTES
Progress Note      Patient Name: Yelitza Bray   Patient ID: [id number]   Sex: Female   YOB: 1960    Referring Provider: Rosie DIXON    Visit Date: April 23, 2021    Provider: ZACK CHOPRA   Location: [facility]   Location Address: [address] [place] [address]  [id number]   Location Phone: [phone]          Chief Complaint  · Follow-up - 4 Week      History Of Present Illness     61-year-old female presenting to the office today for a follow-up with a history of reflux and irritable bowel syndrome.  Patient had a normal colonoscopy back in January.  Patient walked into the office today related to abdominal spasms and bowel incontinence with urgency.  Patient has irritable bowel predominantly mixed.  She has previously been on Amitiza which was causing loose stools multiple times a day week at that medication in half and then discontinued it completely related to loose stools.  She is hydrating well.  She denies hematochezia, melena, fever, nausea, vomiting, weight loss and night sweats.  Patient is experiencing lower abdominal pain but the Bentyl helps to relieve this.    Colonoscopy: Reviewed the patient's most recent colonoscopy performed 1/29/2021 by Dr. Renee revealed a normal mucosa throughout pathology results negative for microscopic colitis.       Review of Systems  · Constitutional  o Denies  o : chills, fever  · Cardiovascular  o Denies  o : chest pain  · Respiratory  o Denies  o : shortness of breath  · Gastrointestinal  o Denies  o : nausea, vomiting, dysphagia  · Endocrine  o Denies  o : weight gain, weight loss      Physical Examination  · Constitutional  o Appearance  o : Healthy-appearing, awake and alert in no acute distress  · Head and Face  o Head  o : Normocephalic with no worriesome skin lesions  · Eyes  o Vision  o :   § Visual Fields  § : eyes move symmetrical in all directions  o Sclerae  o : sclerae  anicteric  · Neck  o Inspection/Palpation  o : Trachea is midline, no adenopathy  · Respiratory  o Respiratory Effort  o : Breathing is unlabored.  o Inspection of Chest  o : normal appearance  o Auscultation of Lungs  o : Chest is clear to auscultation bilaterally.  · Cardiovascular  o Heart  o :   § Auscultation of Heart  § : no murmurs, rubs, or gallops  o Peripheral Vascular System  o :   § Extremities  § : no cyanosis, clubbing or edema;   · Gastrointestinal  o Abdominal Examination  o : Abdomen is soft, nontender to palpation, with normal active bowel sounds, no appreciable hepatosplenomegaly.          Plan  · Medications  o Librax (with clidinium) 5-2.5 mg oral capsule   SIG: take 1 capsule by oral route 3 times per day before meals   DISP: (30) Capsule with 2 refills  Prescribed on 04/23/2021     o metronidazole 500 mg oral tablet   SIG: take one tab po BID x 7 days   DISP: (14) Tablet with 0 refills  Prescribed on 04/23/2021     · Instructions  o 61-year-old female presented to the office today as a walk-in patient related to left lower quadrant pain, bowel incontinence, irritable bowel syndrome, altered bowel habits, diarrhea, nausea. On exam patient does have significant left lower quadrant tenderness and has had more diarrhea with her irritable bowel. I have recommended a CT scan this evening for further evaluation to rule out infectious or inflammatory cause of her pain. I have prescribed Flagyl for 7-day course as well as Librax for pain. I considered cipro but due to patient being on celexa and having history of palpitations I chose not to prescribe. I will call the patient with results. Patient educated that if her abdominal pain were to worsen to report to the ER immediately. I have spoke with  about this case.  · Disposition  o Call or Return if symptoms worsen or persist.  o Meds sent to pharmacy            Electronically Signed by: COLE GAUTHIER APRN -Author on April 23, 2021 02:43:36  PM  Electronically Co-signed by: Lobo Renee MD -Reviewer on May 4, 2021 04:22:40 PM

## 2021-05-15 VITALS
OXYGEN SATURATION: 100 % | BODY MASS INDEX: 23.97 KG/M2 | HEART RATE: 87 BPM | DIASTOLIC BLOOD PRESSURE: 66 MMHG | SYSTOLIC BLOOD PRESSURE: 121 MMHG | HEIGHT: 60 IN | WEIGHT: 122.06 LBS

## 2021-05-15 VITALS
RESPIRATION RATE: 16 BRPM | BODY MASS INDEX: 26.7 KG/M2 | HEIGHT: 60 IN | DIASTOLIC BLOOD PRESSURE: 69 MMHG | OXYGEN SATURATION: 98 % | TEMPERATURE: 99.7 F | HEART RATE: 90 BPM | WEIGHT: 136 LBS | SYSTOLIC BLOOD PRESSURE: 156 MMHG

## 2021-05-15 VITALS
HEIGHT: 60 IN | OXYGEN SATURATION: 100 % | SYSTOLIC BLOOD PRESSURE: 157 MMHG | HEART RATE: 58 BPM | RESPIRATION RATE: 18 BRPM | BODY MASS INDEX: 25.72 KG/M2 | WEIGHT: 131 LBS | TEMPERATURE: 98.4 F | DIASTOLIC BLOOD PRESSURE: 57 MMHG

## 2021-05-15 VITALS
WEIGHT: 131 LBS | SYSTOLIC BLOOD PRESSURE: 137 MMHG | RESPIRATION RATE: 16 BRPM | HEIGHT: 60 IN | OXYGEN SATURATION: 99 % | BODY MASS INDEX: 25.72 KG/M2 | TEMPERATURE: 98.4 F | DIASTOLIC BLOOD PRESSURE: 60 MMHG | HEART RATE: 77 BPM

## 2021-08-30 ENCOUNTER — TRANSCRIBE ORDERS (OUTPATIENT)
Dept: PHYSICAL THERAPY | Facility: CLINIC | Age: 61
End: 2021-08-30

## 2021-08-30 DIAGNOSIS — N39.41 URGE INCONTINENCE: Primary | ICD-10-CM

## 2021-09-14 ENCOUNTER — TREATMENT (OUTPATIENT)
Dept: PHYSICAL THERAPY | Facility: CLINIC | Age: 61
End: 2021-09-14

## 2021-09-14 DIAGNOSIS — N39.41 URGE INCONTINENCE: Primary | ICD-10-CM

## 2021-09-14 DIAGNOSIS — N39.3 STRESS INCONTINENCE: ICD-10-CM

## 2021-09-14 PROCEDURE — 97110 THERAPEUTIC EXERCISES: CPT | Performed by: PHYSICAL THERAPIST

## 2021-09-14 PROCEDURE — 97161 PT EVAL LOW COMPLEX 20 MIN: CPT | Performed by: PHYSICAL THERAPIST

## 2021-09-14 NOTE — PROGRESS NOTES
Physical Therapy Initial Evaluation and Plan of Care      Patient: Yelitza Bray   : 1960  Diagnosis/ICD-10 Code:  Urge incontinence [N39.41]  Referring practitioner: Jacque Penn MD  Date of Initial Visit: 2021  Today's Date: 2021  Patient seen for 1 sessions           Subjective Questionnaire: Urinary distress inventory score 14=40-59% impairment      Subjective Evaluation    History of Present Illness  Mechanism of injury: Patient is a 61 yr old female referred to physical therapy with diagnosis of urge incontinence. Patient reports difficulty controlling bladder and has to wear poise pad for leakage.  Patient states with walking has much leakage.  Patient states goes to restroom every hour at least. Patient also reports having IBS and chronic constipation.      Pain  No pain reported    Patient Goals  Patient goals for therapy: increased strength  Patient goal: Patient states would like to maintain urinary continence.            Objective          Functional Assessment     Comments  Pelvic floor strength 2/5; able to maintain contraction for 4 seconds  Noted difficulty with co-contraction of transverse abdominal  Noted mild posterior bulge in vaginal canal with valsalva manuever  No pain with palpation  Sensation intact            Assessment & Plan     Assessment  Impairments: impaired physical strength and lacks appropriate home exercise program  Other impairment: urinary urge and stress incontinence  Assessment details: Pt presents with limitations, noted by evaluation that impede patient's ability to maintain urinary continence.  The skills of a therapist will be required to safely and effectively implement the following treatment plan to restore maximal level of function.    Prognosis: good    Goals  Plan Goals: 1.Urinary incontinence      LT weeks:The patient will report 75% decrease in urinary incontinence       Status: New      ST weeks: The patient will report 50%  decrease in urinary incontience        Status: New  TREATMENT:  Therapeutic exercise to increase strength and endurance of the pelvic floor, biofeedback as needed to aid in the strengthening process, patient education on extensive HEP, patient education on urge control techniques and pelvic bracing during cough, sneeze, etc.       2.  Pelvic floor weakness and pelvic prolapse.   LTG2: 8 weeks: Patient will present with 4/5 strength of the pelvic floor musculature with patient reporting 75% improvement with complaints of pelvic heaviness and/or prolapse.   STATUS:  New   STG2a: 4weeks:  Strength of the pelvic floor musculature at 3/5.   STATUS:  New   Treatment:  Therapeutic exercise to increase strength and endurance of the pelvic floor, biofeedback as needed to aid in the strengthening process, patient education on extensive HEP, patient education on urge control techniques and pelvic bracing.       3.  Patient with significant constipation worsening patient's pelvic prolapse.   LTG3: 8weeks: Patient reports 75% reduction in constipation  STATUS:  New   STG3a: 4 weeks: Patient reports 50% reduction in constipation.   STATUS:  New   Treatment:  Patient education on constipation reduction techniques to include diet modifications, diaphragmatic breathing, and pelvic bracing with bowel movements.     4. Other PT Primary Functional Limitation     LTG 4: 8 weeks:  The patient will demonstrate 1-19% limitation by achieving a score of 4 on the Urinary distress inventory.    STATUS:  New   STG 4a: 4 weeks:  The patient will demonstrate 20-39% limitation by achieving a score of 9 on the Urinary distress inventory      STATUS:  New   TREATMENT:  Manual therapy, therapeutic exercise, home exercise instruction, and modalities as needed to include: moist heat, electrical stimulation, and ultrasound.    Plan  Therapy options: will be seen for skilled physical therapy services  Planned therapy interventions: home exercise program,  strengthening and abdominal trunk stabilization  Frequency: 1x week  Duration in weeks: 8  Treatment plan discussed with: patient        Visit Diagnoses:    ICD-10-CM ICD-9-CM   1. Urge incontinence  N39.41 788.31       Timed:  Manual Therapy:         mins  49752;  Therapeutic Exercise:    10     mins  44398;     Neuromuscular Kalani:        mins  88087;    Therapeutic Activity:          mins  40197;     Gait Training:           mins  84866;     Ultrasound:          mins  10580;    Electrical Stimulation:         mins  31127 ( );    Untimed:  Electrical Stimulation:         mins  74018 ( );  Mechanical Traction:         mins  11671;   PT evaluation   36 mins    Timed Treatment:   10   mins   Total Treatment:     46   mins    PT SIGNATURE: Shreya Boyer PT         Initial Certification  Certification Period: 9/14/2021 thru 12/13/2021  I certify that the therapy services are furnished while this patient is under my care.  The services outlined above are required by this patient, and will be reviewed every 90 days.     PHYSICIAN: Jacque Penn      DATE:     Please sign and return via fax to [phone]  Thank you, [facility].

## 2021-10-01 ENCOUNTER — TREATMENT (OUTPATIENT)
Dept: PHYSICAL THERAPY | Facility: CLINIC | Age: 61
End: 2021-10-01

## 2021-10-01 DIAGNOSIS — N39.3 STRESS INCONTINENCE: ICD-10-CM

## 2021-10-01 DIAGNOSIS — N39.41 URGE INCONTINENCE: Primary | ICD-10-CM

## 2021-10-01 PROCEDURE — 97110 THERAPEUTIC EXERCISES: CPT | Performed by: PHYSICAL THERAPIST

## 2021-10-01 NOTE — PROGRESS NOTES
Physical Therapy Daily Treatment Note      Patient: Yelitza Bray   : 1960  Referring practitioner: Jacque Penn MD  Date of Initial Visit: Type: THERAPY  Noted: 2021  Today's Date: 10/1/2021  Patient seen for 2 sessions           Subjective   Yelitza Bray reports: compliant with home exercises 2x day.    Objective   See Exercise, Manual, and Modality Logs for complete treatment.       Assessment & Plan     Assessment  Prognosis details: Patient tolerated progression of strengthening today.        Visit Diagnoses:    ICD-10-CM ICD-9-CM   1. Urge incontinence  N39.41 788.31   2. Stress incontinence  N39.3 [id number]       Progress per Plan of Care           Timed:  Manual Therapy:         mins  68441;  Therapeutic Exercise:    24     mins  70444;     Neuromuscular Kalani:        mins  09616;    Therapeutic Activity:          mins  04646;     Gait Training:           mins  96010;     Ultrasound:          mins  57521;    Electrical Stimulation:         mins  67942 ( );    Untimed:  Electrical Stimulation:         mins  05868 ( );  Mechanical Traction:         mins  96897;     Timed Treatment:   24   mins   Total Treatment:     24   mins  Shreya Boyer PT  Physical Therapist

## 2021-10-13 ENCOUNTER — TREATMENT (OUTPATIENT)
Dept: PHYSICAL THERAPY | Facility: CLINIC | Age: 61
End: 2021-10-13

## 2021-10-13 DIAGNOSIS — N39.41 URGE INCONTINENCE: Primary | ICD-10-CM

## 2021-10-13 DIAGNOSIS — N39.3 STRESS INCONTINENCE: ICD-10-CM

## 2021-10-13 PROCEDURE — 97110 THERAPEUTIC EXERCISES: CPT | Performed by: PHYSICAL THERAPIST

## 2021-10-13 NOTE — PROGRESS NOTES
Physical Therapy Daily Treatment Note      Patient: Yelitza Bray   : 1960  Referring practitioner: Jacque Penn MD  Date of Initial Visit: Type: THERAPY  Noted: 2021  Today's Date: 10/13/2021  Patient seen for 3 sessions           Subjective   Yelitza Bray reports: compliant with home exercises.     Objective   See Exercise, Manual, and Modality Logs for complete treatment.       Assessment/Plan    Visit Diagnoses:    ICD-10-CM ICD-9-CM   1. Urge incontinence  N39.41 788.31   2. Stress incontinence  N39.3 [id number]       Progress strengthening /stabilization /functional activity           Timed:  Manual Therapy:         mins  34464;  Therapeutic Exercise:    33     mins  01569;     Neuromuscular Kalani:        mins  41352;    Therapeutic Activity:          mins  02671;     Gait Training:           mins  40183;     Ultrasound:          mins  68610;    Electrical Stimulation:         mins  21049 ( );    Untimed:  Electrical Stimulation:         mins  93581 ( );  Mechanical Traction:         mins  62537;     Timed Treatment:   33   mins   Total Treatment:     33   mins  Shreya Boyer PT  Physical Therapist

## 2021-10-20 ENCOUNTER — TREATMENT (OUTPATIENT)
Dept: PHYSICAL THERAPY | Facility: CLINIC | Age: 61
End: 2021-10-20

## 2021-10-20 DIAGNOSIS — N39.41 URGE INCONTINENCE: Primary | ICD-10-CM

## 2021-10-20 DIAGNOSIS — N39.3 STRESS INCONTINENCE: ICD-10-CM

## 2021-10-20 PROCEDURE — 97110 THERAPEUTIC EXERCISES: CPT | Performed by: PHYSICAL THERAPIST

## 2021-10-20 NOTE — PROGRESS NOTES
Physical Therapy Daily Treatment/Progress Note      Patient: Yelitza Bray   : 1960  Referring practitioner: Jacque Penn MD  Date of Initial Visit: Type: THERAPY  Noted: 2021  Today's Date: 10/20/2021  Patient seen for 4 sessions           Subjective   Yelitza Bray reports: compliant with home exercises; has been doing better with leakage, but yesterday had a few more incidences of incontinence/unknown etiology  Subjective Questionnaire: Urinary distress inventory score 9=20-39% limitation      Objective          Functional Assessment     Comments  Pelvic floor strength 3/5 (Improved 1 grade); able to maintain contraction for 8 seconds (improved 4 seconds)  No pain with palpation  Sensation intact        See Exercise, Manual, and Modality Logs for complete treatment.       Assessment & Plan     Assessment  Impairments: impaired physical strength and lacks appropriate home exercise program  Other impairment: urinary urge and stress incontinence  Assessment details: Pt presents with limitations, noted by evaluation that impede patient's ability to maintain urinary continence.  The skills of a therapist will be required to safely and effectively implement the following treatment plan to restore maximal level of function.    Prognosis: good    Goals  Plan Goals: 1.Urinary incontinence      LT weeks:The patient will report 75% decrease in urinary incontinence       Status: ongoing      ST weeks: The patient will report 50% decrease in urinary incontience        Status: ongoing  TREATMENT:  Therapeutic exercise to increase strength and endurance of the pelvic floor, biofeedback as needed to aid in the strengthening process, patient education on extensive HEP, patient education on urge control techniques and pelvic bracing during cough, sneeze, etc.       2.  Pelvic floor weakness and pelvic prolapse.   LTG2: 8 weeks: Patient will present with 4/5 strength of the pelvic floor musculature  with patient reporting 75% improvement with complaints of pelvic heaviness and/or prolapse.   STATUS:  progressing  STG2a: 4weeks:  Strength of the pelvic floor musculature at 3/5.   STATUS:  Met   Treatment:  Therapeutic exercise to increase strength and endurance of the pelvic floor, biofeedback as needed to aid in the strengthening process, patient education on extensive HEP, patient education on urge control techniques and pelvic bracing.       3.  Patient with significant constipation worsening patient's pelvic prolapse.   LTG3: 8weeks: Patient reports 75% reduction in constipation  STATUS:  ongoing  STG3a: 4 weeks: Patient reports 50% reduction in constipation.   STATUS:  ongoing  Treatment:  Patient education on constipation reduction techniques to include diet modifications, diaphragmatic breathing, and pelvic bracing with bowel movements.     4. Other PT Primary Functional Limitation     LTG 4: 8 weeks:  The patient will demonstrate 1-19% limitation by achieving a score of 4 on the Urinary distress inventory.    STATUS:  progressing   STG 4a: 4 weeks:  The patient will demonstrate 20-39% limitation by achieving a score of 9 on the Urinary distress inventory      STATUS:  met   TREATMENT:  Manual therapy, therapeutic exercise, home exercise instruction, and modalities as needed to include: moist heat, electrical stimulation, and ultrasound.    Plan  Therapy options: will be seen for skilled physical therapy services  Planned therapy interventions: home exercise program, strengthening and abdominal trunk stabilization  Frequency: 1x week  Duration in weeks: 4  Treatment plan discussed with: patient        Visit Diagnoses:    ICD-10-CM ICD-9-CM   1. Urge incontinence  N39.41 788.31   2. Stress incontinence  N39.3 [id number]       Progress strengthening /stabilization /functional activity           Timed:  Manual Therapy:         mins  14000;  Therapeutic Exercise:    38     mins  42169;     Neuromuscular Kalani:         mins  14376;    Therapeutic Activity:          mins  27673;     Gait Training:           mins  29164;     Ultrasound:          mins  07894;    Electrical Stimulation:         mins  27963 ( );    Untimed:  Electrical Stimulation:         mins  26666 ( );  Mechanical Traction:         mins  76069;     Timed Treatment:   38   mins   Total Treatment:     38   mins  Shreya Boyer PT    Electronically singed 10/20/2021      [place] PT license: [id number]  Physical Therapist

## 2021-11-05 ENCOUNTER — TREATMENT (OUTPATIENT)
Dept: PHYSICAL THERAPY | Facility: CLINIC | Age: 61
End: 2021-11-05

## 2021-11-05 DIAGNOSIS — N39.41 URGE INCONTINENCE: Primary | ICD-10-CM

## 2021-11-05 DIAGNOSIS — N39.3 STRESS INCONTINENCE: ICD-10-CM

## 2021-11-05 PROCEDURE — 97110 THERAPEUTIC EXERCISES: CPT | Performed by: PHYSICAL THERAPIST

## 2021-11-05 NOTE — PROGRESS NOTES
Physical Therapy Daily Treatment Note      Patient: Yelitza Bray   : 1960  Referring practitioner: Jacque Penn MD  Date of Initial Visit: Type: THERAPY  Noted: 2021  Today's Date: 2021  Patient seen for 5 sessions           Subjective   Yelitza Bray reports: still having urinary leakage, but not daily    Objective   See Exercise, Manual, and Modality Logs for complete treatment.       Assessment & Plan     Assessment  Prognosis details: Patient tolerated progression of core strengthening today.        Visit Diagnoses:    ICD-10-CM ICD-9-CM   1. Urge incontinence  N39.41 788.31   2. Stress incontinence  N39.3 [id number]       Progress strengthening /stabilization /functional activity           Timed:  Manual Therapy:         mins  42171;  Therapeutic Exercise:    29     mins  77101;     Neuromuscular Kalani:        mins  20260;    Therapeutic Activity:          mins  30182;     Gait Training:           mins  08675;     Ultrasound:          mins  83262;    Electrical Stimulation:         mins  83404 ( );    Untimed:  Electrical Stimulation:         mins  73000 ( );  Mechanical Traction:         mins  41826;     Timed Treatment:   29   mins   Total Treatment:     29   mins  Shreya Boyer PT    Electronically singed 2021      [place] PT license: [id number]  Physical Therapist

## 2021-11-10 ENCOUNTER — TREATMENT (OUTPATIENT)
Dept: PHYSICAL THERAPY | Facility: CLINIC | Age: 61
End: 2021-11-10

## 2021-11-10 DIAGNOSIS — N39.3 STRESS INCONTINENCE: ICD-10-CM

## 2021-11-10 DIAGNOSIS — N39.41 URGE INCONTINENCE: Primary | ICD-10-CM

## 2021-11-10 PROCEDURE — 97110 THERAPEUTIC EXERCISES: CPT | Performed by: PHYSICAL THERAPIST

## 2021-11-10 NOTE — PROGRESS NOTES
Physical Therapy Daily Treatment Note      Patient: Yelitza Bray   : 1960  Referring practitioner: Jacque Penn MD  Date of Initial Visit: Type: THERAPY  Noted: 2021  Today's Date: 11/10/2021  Patient seen for 6 sessions           Subjective   Yelitza Bray reports:still having urinary leakage approx 2 out of 7 days.    Objective   See Exercise, Manual, and Modality Logs for complete treatment.       Assessment & Plan     Assessment  Prognosis details: Patient tolerated progression of strengthening exercises today.        Visit Diagnoses:    ICD-10-CM ICD-9-CM   1. Urge incontinence  N39.41 788.31   2. Stress incontinence  N39.3 [id number]       Progress per Plan of Care            Timed:  Manual Therapy:         mins  06349;  Therapeutic Exercise:    28   mins  12271;     Neuromuscular Kalani:        mins  19487;    Therapeutic Activity:          mins  94563;     Gait Training:           mins  00600;     Ultrasound:          mins  48360;    Electrical Stimulation:         mins  43023 ( );    Untimed:  Electrical Stimulation:         mins  61699 ( );  Mechanical Traction:         mins  58454;     Timed Treatment:   28   mins   Total Treatment:     28   mins  Shreya Boyer PT    Electronically singed 11/10/2021      [place] PT license: [id number]  Physical Therapist

## 2021-11-17 ENCOUNTER — TREATMENT (OUTPATIENT)
Dept: PHYSICAL THERAPY | Facility: CLINIC | Age: 61
End: 2021-11-17

## 2021-11-17 DIAGNOSIS — N39.3 STRESS INCONTINENCE: ICD-10-CM

## 2021-11-17 DIAGNOSIS — N39.41 URGE INCONTINENCE: Primary | ICD-10-CM

## 2021-11-17 PROCEDURE — 97110 THERAPEUTIC EXERCISES: CPT | Performed by: PHYSICAL THERAPIST

## 2021-11-17 NOTE — PROGRESS NOTES
Physical Therapy Daily Treatment Note      Patient: Yelitza Bray   : 1960  Referring practitioner: Jacque Penn MD  Date of Initial Visit: Type: THERAPY  Noted: 2021  Today's Date: 2021  Patient seen for 7 sessions           Subjective   Yelitza Bray reports: still having leakage of urine at times.    Objective   See Exercise, Manual, and Modality Logs for complete treatment.       Assessment/Plan    Visit Diagnoses:    ICD-10-CM ICD-9-CM   1. Urge incontinence  N39.41 788.31   2. Stress incontinence  N39.3 [id number]       Anticipate DC next Visit           Timed:  Manual Therapy:         mins  43852;  Therapeutic Exercise:    30     mins  75326;     Neuromuscular Kalani:        mins  53798;    Therapeutic Activity:          mins  81598;     Gait Training:           mins  23340;     Ultrasound:          mins  59403;    Electrical Stimulation:         mins  40063 ( );    Untimed:  Electrical Stimulation:         mins  97730 ( );  Mechanical Traction:         mins  42870;     Timed Treatment:   30   mins   Total Treatment:     30   mins  Shreya Boyer PT    Electronically singed 2021      [place] PT license: [id number]  Physical Therapist

## 2021-12-01 ENCOUNTER — TREATMENT (OUTPATIENT)
Dept: PHYSICAL THERAPY | Facility: CLINIC | Age: 61
End: 2021-12-01

## 2021-12-01 DIAGNOSIS — N39.41 URGE INCONTINENCE: Primary | ICD-10-CM

## 2021-12-01 DIAGNOSIS — N39.3 STRESS INCONTINENCE: ICD-10-CM

## 2021-12-01 PROCEDURE — 97110 THERAPEUTIC EXERCISES: CPT | Performed by: PHYSICAL THERAPIST

## 2021-12-01 NOTE — PROGRESS NOTES
Physical Therapy Discharge      Patient: Yelitza Bray   : 1960  Referring practitioner: Jacque Penn MD  Date of Initial Visit: Type: THERAPY  Noted: 2021  Today's Date: 2021  Patient seen for 8 sessions           Subjective   Yelitza Bray reports: recently finished antibiotics for UTI and now having a yeast infection.  Patient reports still having leakage  Subjective Questionnaire: Urinary distress inventory 20-39% limitation      Objective   See Exercise, Manual, and Modality Logs for complete treatment.       Assessment & Plan       Goals  Plan Goals: 1.Urinary incontinence      LT weeks:The patient will report 75% decrease in urinary incontinence       Status:Not met      ST weeks: The patient will report 50% decrease in urinary incontience        Status: Not met; improvement but less than 50%  TREATMENT:  Therapeutic exercise to increase strength and endurance of the pelvic floor, biofeedback as needed to aid in the strengthening process, patient education on extensive HEP, patient education on urge control techniques and pelvic bracing during cough, sneeze, etc.   2.  Pelvic floor weakness and pelvic prolapse.   LTG2: 8 weeks: Patient will present with 4/5 strength of the pelvic floor musculature with patient reporting 75% improvement with complaints of pelvic heaviness and/or prolapse.   STATUS: NA secondary to recent UTI and current yeast infection  STG2a: 4weeks:  Strength of the pelvic floor musculature at 3/5.   STATUS:  Met   Treatment:  Therapeutic exercise to increase strength and endurance of the pelvic floor, biofeedback as needed to aid in the strengthening process, patient education on extensive HEP, patient education on urge control techniques and pelvic bracing.     3.  Patient with significant constipation worsening patient's pelvic prolapse.   LTG3: 8weeks: Patient reports 75% reduction in constipation  STATUS:  Not met  STG3a: 4 weeks: Patient reports 50%  reduction in constipation.   STATUS:  Not met  Treatment:  Patient education on constipation reduction techniques to include diet modifications, diaphragmatic breathing, and pelvic bracing with bowel movements.     4. Other PT Primary Functional Limitation                               LTG 4: 8 weeks:  The patient will demonstrate 1-19% limitation by achieving a score of 4 on the Urinary distress inventory.                          STATUS:  Not met              STG 4a: 4 weeks:  The patient will demonstrate 20-39% limitation by achieving a score of 9 on the Urinary distress inventory                            STATUS:  met    Plan  Plan details: Patient discharged to home exercise program and to call and schedule a follow up appt with referring provider.         Visit Diagnoses:    ICD-10-CM ICD-9-CM   1. Urge incontinence  N39.41 788.31   2. Stress incontinence  N39.3 [id number]       Discharge patient to home exercise program and follow up with MD           Timed:  Manual Therapy:         mins  41814;  Therapeutic Exercise:    26     mins  91818;     Neuromuscular Kalani:        mins  45068;    Therapeutic Activity:          mins  17630;     Gait Training:           mins  37351;     Ultrasound:          mins  76759;    Electrical Stimulation:         mins  63866 ( );    Untimed:  Electrical Stimulation:         mins  69972 ( );  Mechanical Traction:         mins  16262;     Timed Treatment:   26   mins   Total Treatment:     26   mins  Shreya Boyer PT    Electronically singed 12/1/2021      [place] PT license: [id number]  Physical Therapist

## 2021-12-17 ENCOUNTER — TRANSCRIBE ORDERS (OUTPATIENT)
Dept: ADMINISTRATIVE | Facility: HOSPITAL | Age: 61
End: 2021-12-17

## 2021-12-17 DIAGNOSIS — R00.2 PALPITATIONS: Primary | ICD-10-CM

## 2021-12-29 ENCOUNTER — TRANSCRIBE ORDERS (OUTPATIENT)
Dept: ADMINISTRATIVE | Facility: HOSPITAL | Age: 61
End: 2021-12-29

## 2021-12-29 DIAGNOSIS — Z12.31 VISIT FOR SCREENING MAMMOGRAM: Primary | ICD-10-CM

## 2022-02-11 ENCOUNTER — HOSPITAL ENCOUNTER (OUTPATIENT)
Dept: CARDIOLOGY | Facility: HOSPITAL | Age: 62
Discharge: HOME | End: 2022-02-11
Admitting: NURSE PRACTITIONER

## 2022-02-11 DIAGNOSIS — R00.2 PALPITATIONS: ICD-10-CM

## 2022-02-11 LAB
BH CV ECHO MEAS - AO ROOT DIAM: 2.8 CM
BH CV ECHO MEAS - EF(MOD-BP): 65 %
BH CV ECHO MEAS - IVSD: 0.9 CM
BH CV ECHO MEAS - LA DIMENSION(2D): 3.4 CM
BH CV ECHO MEAS - LAT PEAK E' VEL: 7.7 CM/SEC
BH CV ECHO MEAS - LVIDD: 4.9 CM
BH CV ECHO MEAS - LVIDS: 2.9 CM
BH CV ECHO MEAS - LVPWD: 0.9 CM
BH CV ECHO MEAS - MED PEAK E' VEL: 6 CM/SEC
BH CV ECHO MEAS - MV A MAX VEL: 96.4 CM/SEC
BH CV ECHO MEAS - MV DEC TIME: 184 MSEC
BH CV ECHO MEAS - MV E MAX VEL: 77.6 CM/SEC
BH CV ECHO MEAS - MV E/A: 0.8
BH CV ECHO MEAS - RVDD: 2.4 CM
BH CV ECHO MEAS - RVSP: 31 MMHG
BH CV ECHO MEAS - TR MAX PG: 26 MMHG
BH CV ECHO MEAS - TR MAX VEL: 256 CM/SEC
BH CV ECHO MEASUREMENTS AVERAGE E/E' RATIO: 11.33
IVRT: 77 MSEC
LEFT ATRIUM VOLUME INDEX: 30.2 ML/M2
MAXIMAL PREDICTED HEART RATE: 159 BPM
STRESS TARGET HR: 135 BPM

## 2022-02-11 PROCEDURE — 93306 TTE W/DOPPLER COMPLETE: CPT

## 2022-02-11 PROCEDURE — 93306 TTE W/DOPPLER COMPLETE: CPT | Performed by: INTERNAL MEDICINE

## 2022-03-03 ENCOUNTER — APPOINTMENT (OUTPATIENT)
Dept: MAMMOGRAPHY | Facility: HOSPITAL | Age: 62
End: 2022-03-03

## 2022-04-04 ENCOUNTER — HOSPITAL ENCOUNTER (OUTPATIENT)
Dept: MAMMOGRAPHY | Facility: HOSPITAL | Age: 62
Discharge: HOME | End: 2022-04-04
Admitting: NURSE PRACTITIONER

## 2022-04-04 DIAGNOSIS — Z12.31 VISIT FOR SCREENING MAMMOGRAM: ICD-10-CM

## 2022-04-04 PROCEDURE — 77067 SCR MAMMO BI INCL CAD: CPT

## 2022-04-04 PROCEDURE — 77063 BREAST TOMOSYNTHESIS BI: CPT

## 2022-06-03 NOTE — PROGRESS NOTES
Progress Note      Patient Name: Yelitza Bray   Patient ID: [id number]   Sex: Female   YOB: 1960    Primary Care Provider: Db Williamson MD   Referring Provider: Db Williamson MD    Visit Date: March 29, 2021    Provider: ZACK CHOPRA   Location: [facility]   Location Address: [address] [place] [address]  [id number]   Location Phone: [phone]          Chief Complaint  · Follow up of Colonoscopy      History Of Present Illness     61-year-old female presented to the office today for a follow-up with a history of reflux and irritable bowel syndrome.  We have reviewed the patient's most recent colonoscopy and pathology results.  Patient had a normal colonoscopy back in January.  Patient was started on Amitiza 24 mcg twice daily patient reports that her bowel movements are occurring multiple times a day anywhere from 6-7 times and are pudding texture she is having some urgency and incontinence with Amitiza.  Patient is hydrating well drinking anywhere from 70 to 80 ounces of water a day.  Patient denies hematochezia, melena, fever, nausea, vomiting, weight loss, night sweats.  Patient is experiencing lower abdominal pain and the Bentyl helps to relieve the pain.  She is only taking the Bentyl as needed 1-2 times a week.  Patient does experience reflux and she is taking over-the-counter famotidine which helps to control her reflux.    Colonoscopy: Reviewed the patient's most recent colonoscopy performed 1/29/2021 by Dr. Pascual revealed a normal mucosa throughout the colon.  Patient was placed on Amitiza 24 mcg twice daily in place of Linzess and given Bentyl.  Pathology results negative for microscopic colitis.       Past Medical History  Disease Name Date Onset Notes   Allergic rhinitis, chronic --  --    Constipation --  --    Cystitis (Chronic) --  --    GERD --  --    Hematuria 10/02/2014 --    Limb Swelling --  --    Mitral Valve Disorders --  MVP   Reflux --   --    Urinary tract infection --  --    UTI 10/02/2014 --          Past Surgical History  Procedure Name Date Notes   breast reduction 2010 --    Cholecystectomy 2005 --    Colonoscopy 2007 2012 2017 2021 --    endoscopy 2004 2012 --    Plastic surgery 2012 blepharoplasty   Salpingectomy 2007 --          Medication List  Name Date Started Instructions   Amitiza 24 mcg oral capsule  take 1 capsule (24 mcg) by oral route 2 times per day with food and water   biotin 5,000 mcg oral tablet,disintegrating  dissolve 1 tablet by oral route daily   Celexa 10 mg oral tablet  take 1 tablet (10 mg) by oral route once daily   dicyclomine 20 mg oral tablet 06/10/2020 take 1 tablet (20 mg) by oral route 4 times per day prn   famotidine 20 mg oral tablet  take 1 tablet (20 mg) by oral route 2 times per day   fluticasone propionate 50 mcg/actuation nasal spray,suspension 01/24/2020 inhale 2 sprays (100 mcg) in each nostril by intranasal route once daily   metoprolol succinate 25 mg Oral Tablet Extended Release 24 hr  take 1 tablet (25 mg) by oral route once daily   Minivelle 0.025 mg/24 hr transdermal patch semiweekly  apply 1 patch by transdermal route twice weekly   NuLev 0.125 mg oral tablet,disintegrating 07/22/2020 take 1 tablet (0.125 mg) by oral route 3 times per day for 30 days   Probiotic 3 billion cell oral capsule  take 1 capsule by oral route daily   Vitamin D3 125 mcg (5,000 unit) oral tablet  take 1 tablet by oral route daily         Allergy List  Allergen Name Date Reaction Notes   Phenergan --  --  --    SULFA (SULFONAMIDES) --  --  --          Family Medical History  Disease Name Relative/Age Notes   Stroke Mother/   Mother   Heart Disease Brother/  Father/  Mother/   Father; Mother; Brother   Diabetes, unspecified type Brother/   Brother   - No Family History of Colorectal Cancer  --    Family history of certain chronic disabling diseases; arthritis Father/  Mother/   Mother; Father         Social History  Finding  Status Start/Stop Quantity Notes   Alcohol Never --/-- --  drinks no   Caffeine Current every day --/-- --  drinks regularly; tea; 5 or more times per day   lives with spouse --  --/-- --  --     --  --/-- --  --    Recreational Drug Use Never --/-- --  no   Second hand smoke exposure Never --/-- --  no   Tobacco Former [age] --  former smoker; started smoking at age 14; quit smoking at age 24  former smoker   Working --  --/-- --  --          Review of Systems  · Constitutional  o Denies  o : chills, fatigue, fever, loss of appetite, malaise, night sweats, weakness, weight gain, weight loss  · Cardiovascular  o Denies  o : chest pain  · Respiratory  o Denies  o : shortness of breath  · Gastrointestinal  o Admits  o : abdominal pain, diarrhea, heartburn, reflux  o Denies  o : appetite poor, bloating, blood in stools, bowel changes, constipation, early satiety, excessive belching, hematemesis (vomiting blood), hemorrhoids, indigestion, jaundice, nausea, rectal bleeding, vomiting, dysphagia  · Endocrine  o Denies  o : weight gain, weight loss      Vitals  Date Time BP Position Site L\R Cuff Size HR RR TEMP (F) WT  HT  BMI kg/m2 BSA m2 O2 Sat FR L/min FiO2 HC       03/29/2021 03:17 /65 Sitting    59 - R 16  123lbs 0oz 5'   24.02 1.54 99 %            Physical Examination  · Constitutional  o Appearance  o : Healthy-appearing, awake and alert in no acute distress  · Head and Face  o Head  o : Normocephalic with no worriesome skin lesions  · Eyes  o Vision  o :   § Visual Fields  § : eyes move symmetrical in all directions  o Sclerae  o : sclerae anicteric  · Neck  o Inspection/Palpation  o : Trachea is midline, no adenopathy  · Respiratory  o Respiratory Effort  o : Breathing is unlabored.  o Inspection of Chest  o : normal appearance  o Auscultation of Lungs  o : Chest is clear to auscultation bilaterally.  · Cardiovascular  o Heart  o :   § Auscultation of Heart  § : no murmurs, rubs, or gallops, regular  rate and rhythm  o Peripheral Vascular System  o :   § Extremities  § : no cyanosis, clubbing or edema;   · Gastrointestinal  o Abdominal Examination  o : Abdomen is soft, nontender to palpation, with normal active bowel sounds, no appreciable hepatosplenomegaly.          Assessment  · Abdominal Pain, RLQ     789.03/R10.31  · Abdominal Pain, LLQ     789.04/R10.32  · Altered Bowel Habits     787.99/R19.4  · Irritable bowel syndrome with constipation     564.1/K58.1      Plan  · Orders  o e-prescribe - at least one () - - 03/29/2021  · Medications  o Amitiza 24 mcg oral capsule   SIG: take 1 capsule by oral route daily   DISP: (30) Capsule with 2 refills  Adjusted on 03/29/2021     o dicyclomine 20 mg oral tablet   SIG: take 1 tablet (20 mg) by oral route 4 times per day prn   DISP: (120) Tablet with 3 refills  Refilled on 03/29/2021     o Medications have been Reconciled  o Transition of Care or Provider Policy  · Instructions  o 61-year-old female presenting to the office today for follow-up with a history of reflux and irritable bowel syndrome. We have reviewed the patient's most recent colonoscopy and pathology results. Patient has been on Amitiza 24 mcg twice daily having multiple bowel movements a day anywhere from 6-7 with some urgency and incontinence. I will decrease the Amitiza to once daily to see if this improves the urgency incontinence and number of bowel movements the patient is having daily. I will refill the patient's Bentyl and she will use this more sparingly as needed for her abdominal pain. She will continue famotidine over-the-counter and this works well for her reflux. We will follow-up in the office in 3 months. Patient to call with any questions or concerns. If patient continues to have incontinence and urgency with 1 a day Amitiza she will call sooner and we can make changes.  · Disposition  o Call or Return if symptoms worsen or persist.  o Meds sent to  pharmacy            Electronically Signed by: ZACK CHOPRA -Author on March 29, 2021 03:31:57 PM  Electronically Co-signed by: Lobo Renee MD -Reviewer on April 12, 2021 08:25:23 PM   No

## 2023-01-20 ENCOUNTER — TRANSCRIBE ORDERS (OUTPATIENT)
Dept: ADMINISTRATIVE | Facility: HOSPITAL | Age: 63
End: 2023-01-20
Payer: COMMERCIAL

## 2023-01-20 DIAGNOSIS — Z12.31 ENCOUNTER FOR SCREENING MAMMOGRAM FOR BREAST CANCER: Primary | ICD-10-CM

## 2023-04-19 ENCOUNTER — HOSPITAL ENCOUNTER (OUTPATIENT)
Dept: MAMMOGRAPHY | Facility: HOSPITAL | Age: 63
Discharge: HOME | End: 2023-04-19
Admitting: OBSTETRICS & GYNECOLOGY
Payer: COMMERCIAL

## 2023-04-19 DIAGNOSIS — Z12.31 ENCOUNTER FOR SCREENING MAMMOGRAM FOR BREAST CANCER: ICD-10-CM

## 2023-04-19 PROCEDURE — 77067 SCR MAMMO BI INCL CAD: CPT

## 2023-04-19 PROCEDURE — 77063 BREAST TOMOSYNTHESIS BI: CPT

## 2024-03-06 ENCOUNTER — TRANSCRIBE ORDERS (OUTPATIENT)
Dept: ADMINISTRATIVE | Facility: HOSPITAL | Age: 64
End: 2024-03-06
Payer: COMMERCIAL

## 2024-03-06 DIAGNOSIS — Z12.31 ENCOUNTER FOR SCREENING MAMMOGRAM FOR MALIGNANT NEOPLASM OF BREAST: Primary | ICD-10-CM

## 2024-04-17 ENCOUNTER — TREATMENT (OUTPATIENT)
Dept: PHYSICAL THERAPY | Facility: CLINIC | Age: 64
End: 2024-04-17
Payer: COMMERCIAL

## 2024-04-17 DIAGNOSIS — M79.642 LEFT HAND PAIN: Primary | ICD-10-CM

## 2024-04-17 DIAGNOSIS — M79.641 RIGHT HAND PAIN: ICD-10-CM

## 2024-04-17 NOTE — PROGRESS NOTES
Outpatient Occupational Therapy Ortho Initial Evaluation  [address] [place] [id number]    Patient: Yelitza Bray   : 1960  Referring practitioner: ZACK Babin  Date of Initial Visit: 2024  Today's Date: 2024  Patient seen for 1 sessions  Diagnosis/ICD-10 Code:      ICD-10-CM ICD-9-CM   1. Left hand pain  M79.642 729.5   2. Right hand pain  M79.641 729.5                  Subjective Evaluation    History of Present Illness  Date of onset: 2020  Mechanism of injury: Diagnosed with osteoarthrtitis in B hand.  Having decreased  strength, pain and burning in B hands, and decreased function.  Injections in MCP joints.  No longer getting relief      Patient Occupation: dental assistant Quality of life: excellent    Pain  Current pain ratin  Location: MCP joints, wrist and DIP  Quality: burning  Relieving factors: medications (meloxicam)  Exacerbated by: dental work.  Progression: worsening    Social Support  Lives in: multiple-level home  Lives with: spouse    Hand dominance: right    Diagnostic Tests  X-ray: abnormal    Treatments  Previous treatment: injection treatment  Patient Goals  Patient goals for therapy: decreased pain, increased motion, increased strength, decreased edema and return to work  Patient goal: relief from the pain, and use my hand normal       Past Medical hx: mitral valve prolapse,     Objective          Active Range of Motion     Left Wrist   Wrist flexion: 90 degrees with pain  Wrist extension: 60 degrees     Right Wrist   Wrist flexion: 85 degrees   Wrist extension: 25 degrees     Left Thumb   Opposition: R hand  0-95 0-80 0-15    DIP radial deviated 45 degrees  0-95 0-95 0-70  0-95 0-95 0-80  0-95 0-95 0-90    L hand   Hyper 20-85 20-95 Lacks 20-65  Hyper 25-85 20-95 Lacks 20-65  Hyper  20-95              0-95  Hyper  20-95 Lacks 15-95          Swelling     Left Wrist/Hand   Circumference MCP: 18.5 cm  Circumference wrist: 14.5  cm    Right Wrist/Hand   Circumference MCP: 18.8 cm  Circumference wrist: 14.5 cm          Assessment & Plan       Assessment  Impairments: abnormal coordination, abnormal muscle firing, abnormal or restricted ROM, activity intolerance, impaired physical strength, lacks appropriate home exercise program, pain with function, safety issue and weight-bearing intolerance   Functional limitations: carrying objects, lifting, pulling, pushing, reaching behind back, reaching overhead and unable to perform repetitive tasks     Goals  Plan Goals: 1. The patient complains of pain in the B hands                  LTG 1: 12 weeks:  The patient will report a pain rating of 2/10 or better in order to improve sleep quality and tolerance to performance of activities of daily living.                                  STATUS:  New                  STG 1a: 4weeks:  The patient will report a pain rating of 3/10 or better.                                   STATUS:  New  2. The patient has arthritic joint deformity of DIP joints                  LTG 2: 12 weeks:  The patient will demonstrate understanding and education of joint protection with oval 8 splints for B IF.                                  STATUS:  New                   STG 2a: 8 weeks:  The patient will demonstrate understanding and education of joint protection with resting hand splints for B MCP and wrist protection                                  STATUS:  New                             3. The patient has needs joint protection education                  LTG 3: 12 weeks:  The patient will demonstrate understanding of joint protection and pain mgmt techniques for B hand arthritis.                                  STATUS:  New                 TREATMENT: Orthotic fabrication/fitting/management and training, Manual therapy, therapeutic exercise, home exercise instruction, and modalities as needed to include: electrical stimulation, ultrasound, moist heat, paraffin,  fluidotherapy, dry needling, and ice.       Plan  Planned modality interventions: TENS, thermotherapy (hydrocollator packs), thermotherapy (paraffin bath), ultrasound and electrical stimulation/Russian stimulation  Other planned modality interventions: fluidotherapy, dry needling  Planned therapy interventions: manual therapy, ADL retraining, motor coordination training, neuromuscular re-education, soft tissue mobilization, fine motor coordination training, body mechanics training, balance/weight-bearing training, functional ROM exercises, flexibility, spinal/joint mobilization, strengthening, stretching, therapeutic activities, IADL retraining, joint mobilization and home exercise program  Frequency: 2x week  Duration in weeks: 12  Treatment plan discussed with: patient        Patient is indicated for skilled occupational therapy services.    History # of Personal Factors and/or Comorbidities: MODERATE (1-2)  Examination of Body System(s): # of elements: MODERATE (3)  Clinical Presentation: EVOLVING  Clinical Decision Making: MODERATE     Evaluation:  Low Complexity:    0     mins  71495;  Mod Complexity:    30     mins  14866;  High Complexity:    0     mins  74319;    Timed:  Manual Therapy:    0     mins  10510;  Therapeutic Exercise:    0     mins  21038;  Therapeutic Activity:    10     mins  48875;     Neuromuscular Kalani:    10    mins  85660;    Ultrasound:     0     mins  17517;    Electrical Stimulation:    0     mins  50093;    Untimed:  Electrical Stimulation:    0     mins  05323 ( );  Fluidotherapy:        0    mins  66415  Dry Needlin    mins      Timed Treatment:   20   mins   Total Treatment:     50   mins      OT SIGNATURE: LUKE Kothari, OTR/L, CHT     Electronically signed    [place] LICENSE: [id number]   DATE TREATMENT INITIATED: 2024    Initial Certification  Certification Period: 2024 thru 7/15/2024  I certify that the therapy services are furnished while  this patient is under my care.  The services outlined above are required by this patient, and will be reviewed every 90 days.     Signature:______________________________________________ PHYSICIAN:  Rosie Cantu APRN   NPI: [id number]                                      DATE:    Please sign and return via fax to [phone]   Thank you, [facility]ational Therapy.

## 2024-04-24 ENCOUNTER — TREATMENT (OUTPATIENT)
Dept: PHYSICAL THERAPY | Facility: CLINIC | Age: 64
End: 2024-04-24
Payer: COMMERCIAL

## 2024-04-24 ENCOUNTER — HOSPITAL ENCOUNTER (OUTPATIENT)
Dept: MAMMOGRAPHY | Facility: HOSPITAL | Age: 64
Discharge: HOME | End: 2024-04-24
Admitting: OBSTETRICS & GYNECOLOGY
Payer: COMMERCIAL

## 2024-04-24 DIAGNOSIS — M79.641 RIGHT HAND PAIN: ICD-10-CM

## 2024-04-24 DIAGNOSIS — M79.642 LEFT HAND PAIN: Primary | ICD-10-CM

## 2024-04-24 DIAGNOSIS — Z12.31 ENCOUNTER FOR SCREENING MAMMOGRAM FOR MALIGNANT NEOPLASM OF BREAST: ICD-10-CM

## 2024-04-24 PROCEDURE — 77067 SCR MAMMO BI INCL CAD: CPT

## 2024-04-24 PROCEDURE — L3808 WHFO, RIGID W/O JOINTS: HCPCS | Performed by: OCCUPATIONAL THERAPIST

## 2024-04-24 PROCEDURE — 77063 BREAST TOMOSYNTHESIS BI: CPT

## 2024-04-24 NOTE — PROGRESS NOTES
[address] [place] [address]  Yelitza Bray 1960   Diagnosis/ Surgery: arthritis              Date Of Injury: progressive arthritis    Date Of Surgery:NA    Hand Dominance: R handed  History of Present Condition: progressive deformity of R hand at MP and IP joints  Medical/Vocational History/ Medications: dental assistant    Pain: moderate    Edema: NA  Sensibility: WNL       Splinting:  Patient was measure and fit with a custom fabricated resting hand splint R hand for night use.    Patient was instructed in wearing schedule, precautions and care of the splint during this visit.   Patient was instructed in proper donning/doffing of splint.   Assessment:  Patient was fitted and appropriate splint was fabricated this date.  Patient reported that splint was comfortable and had no complications with the fit of the splint.  Patient was instructed and patient verbalized understanding of precautions, wear and care of the splint.   Patient demonstrated independent donning/doffing of splint during treatment today.  Goals:  Patient was fitted properly with appropriate splint for diagnosis  Patient was educated on precautions, wear schedule and care of splint  Patient demonstrated independence with donning/doffing of the splint.  Splint was provided to Protect Healing Structures, Restrict Mobility, Improve joint alignment.  Plan:  additional treatment is required for this patient for L hand splint.   Patient advised to contact therapist with any additional questions or concerns regarding the fit and function of the splint.  Patient will be seen for splint issues as needed   Wear Instructions: Off for hygiene       OT SIGNATURE: LUKE Kothari, LYUDMILA/L, CHT   DATE TREATMENT INITIATED: 4/24/2024    Physician Signature____________________________________ Date____________

## 2024-05-08 ENCOUNTER — TREATMENT (OUTPATIENT)
Dept: PHYSICAL THERAPY | Facility: CLINIC | Age: 64
End: 2024-05-08
Payer: COMMERCIAL

## 2024-05-08 DIAGNOSIS — M79.642 LEFT HAND PAIN: Primary | ICD-10-CM

## 2024-05-08 DIAGNOSIS — M79.641 RIGHT HAND PAIN: ICD-10-CM

## 2024-05-08 PROCEDURE — 97763 ORTHC/PROSTC MGMT SBSQ ENC: CPT | Performed by: OCCUPATIONAL THERAPIST

## 2024-05-08 PROCEDURE — 97530 THERAPEUTIC ACTIVITIES: CPT | Performed by: OCCUPATIONAL THERAPIST

## 2024-08-21 ENCOUNTER — TELEPHONE (OUTPATIENT)
Dept: GASTROENTEROLOGY | Facility: CLINIC | Age: 64
End: 2024-08-21

## 2024-08-28 ENCOUNTER — TELEPHONE (OUTPATIENT)
Dept: GASTROENTEROLOGY | Facility: CLINIC | Age: 64
End: 2024-08-28
Payer: COMMERCIAL

## 2024-08-28 NOTE — TELEPHONE ENCOUNTER
Received call from patient in regard to transfer of care to Alyx Jackman. She states she was told we need to send records before appt can be scheduled.   I advised I will contact the office to see what info they are needing to transfer the care and call her back.

## 2024-10-30 ENCOUNTER — LAB (OUTPATIENT)
Dept: LAB | Facility: HOSPITAL | Age: 64
End: 2024-10-30
Payer: COMMERCIAL

## 2024-10-30 ENCOUNTER — OFFICE VISIT (OUTPATIENT)
Dept: GASTROENTEROLOGY | Facility: CLINIC | Age: 64
End: 2024-10-30
Payer: COMMERCIAL

## 2024-10-30 VITALS
TEMPERATURE: 96 F | OXYGEN SATURATION: 100 % | HEIGHT: 60 IN | HEART RATE: 64 BPM | WEIGHT: 126.3 LBS | DIASTOLIC BLOOD PRESSURE: 75 MMHG | SYSTOLIC BLOOD PRESSURE: 120 MMHG | BODY MASS INDEX: 24.8 KG/M2

## 2024-10-30 DIAGNOSIS — K58.0 IRRITABLE BOWEL SYNDROME WITH DIARRHEA: ICD-10-CM

## 2024-10-30 DIAGNOSIS — Z86.19 HISTORY OF CLOSTRIDIOIDES DIFFICILE INFECTION: ICD-10-CM

## 2024-10-30 DIAGNOSIS — R19.7 DIARRHEA, UNSPECIFIED TYPE: Primary | ICD-10-CM

## 2024-10-30 DIAGNOSIS — R10.9 ABDOMINAL CRAMPING: ICD-10-CM

## 2024-10-30 DIAGNOSIS — R14.0 BLOATING: ICD-10-CM

## 2024-10-30 LAB — TSH SERPL DL<=0.05 MIU/L-ACNC: 0.56 UIU/ML (ref 0.27–4.2)

## 2024-10-30 PROCEDURE — 36415 COLL VENOUS BLD VENIPUNCTURE: CPT | Performed by: NURSE PRACTITIONER

## 2024-10-30 PROCEDURE — 86364 TISS TRNSGLTMNASE EA IG CLAS: CPT | Performed by: NURSE PRACTITIONER

## 2024-10-30 PROCEDURE — 99203 OFFICE O/P NEW LOW 30 MIN: CPT | Performed by: NURSE PRACTITIONER

## 2024-10-30 PROCEDURE — 86231 EMA EACH IG CLASS: CPT | Performed by: NURSE PRACTITIONER

## 2024-10-30 PROCEDURE — 84443 ASSAY THYROID STIM HORMONE: CPT | Performed by: NURSE PRACTITIONER

## 2024-10-30 PROCEDURE — 82784 ASSAY IGA/IGD/IGG/IGM EACH: CPT | Performed by: NURSE PRACTITIONER

## 2024-10-30 RX ORDER — CETIRIZINE HYDROCHLORIDE 10 MG/1
1 TABLET ORAL DAILY
COMMUNITY

## 2024-10-30 RX ORDER — METOPROLOL SUCCINATE 25 MG/1
1 TABLET, EXTENDED RELEASE ORAL DAILY
COMMUNITY

## 2024-10-30 RX ORDER — FAMOTIDINE 20 MG/1
20 TABLET, FILM COATED ORAL EVERY MORNING
COMMUNITY

## 2024-10-30 RX ORDER — MIRABEGRON 50 MG/1
TABLET, FILM COATED, EXTENDED RELEASE ORAL EVERY 24 HOURS
COMMUNITY

## 2024-10-30 RX ORDER — ESTRADIOL 0.05 MG/D
PATCH, EXTENDED RELEASE TRANSDERMAL
COMMUNITY
Start: 2024-10-22

## 2024-10-30 RX ORDER — CITALOPRAM HYDROBROMIDE 10 MG/1
1 TABLET ORAL DAILY
COMMUNITY

## 2024-10-30 RX ORDER — CELECOXIB 200 MG/1
200 CAPSULE ORAL DAILY PRN
COMMUNITY
Start: 2024-10-01

## 2024-10-30 RX ORDER — MONTELUKAST SODIUM 4 MG/1
1 TABLET, CHEWABLE ORAL 2 TIMES DAILY
Qty: 60 TABLET | Refills: 5 | Status: SHIPPED | OUTPATIENT
Start: 2024-10-30

## 2024-10-30 NOTE — PATIENT INSTRUCTIONS
Check lab work to check the thyroid and to check for celiac disease.    Submit stool sample to check for C. difficile.    Schedule hydrogen breath test to assess for small intestinal bacterial overgrowth.     For diarrhea, start colestipol.  Prescription sent to pharmacy.

## 2024-10-30 NOTE — PROGRESS NOTES
"Chief Complaint   Patient presents with    Diarrhea         History of Present Illness  Patient is a 64-year-old female who presents today for evaluation referred for irritable bowel syndrome.    Patient presents today for evaluation.  She reports a longstanding history of irritable bowel syndrome.  She historically has alternated somewhat between diarrhea and constipation.  She had C. difficile 3 to 4 years ago and reports she has had more issues with diarrhea since that time.    She has had significant worsening of symptoms over the last 6 months.  She is having diarrhea around 10 to 15 days/month and on the day she has diarrhea she will have multiple bowel movements.  She reports abdominal cramping, bloating, and increased gas.  She has had nausea.  She reports fecal urgency and at times has had fecal incontinence.    She has previously been treated with dicyclomine with no improvement.  She is currently using hyoscyamine which does help somewhat.  She has tried a low FODMAP diet which did not change her symptoms.     Result Review :       SCANNED PATHOLOGY (01/29/2021)    SCANNED - COLONOSCOPY (01/29/2021)    CT Abdomen Pelvis With Contrast (04/23/2021 19:35)    Vital Signs:   /75   Pulse 64   Temp 96 °F (35.6 °C)   Ht 152.4 cm (60\")   Wt 57.3 kg (126 lb 4.8 oz)   SpO2 100%   BMI 24.67 kg/m²     Body mass index is 24.67 kg/m².     Physical Exam  Vitals reviewed.   Constitutional:       General: She is not in acute distress.     Appearance: She is well-developed.   HENT:      Head: Normocephalic and atraumatic.   Pulmonary:      Effort: Pulmonary effort is normal. No respiratory distress.   Abdominal:      General: Abdomen is flat. Bowel sounds are normal. There is no distension.      Palpations: Abdomen is soft.      Tenderness: There is no abdominal tenderness.   Skin:     General: Skin is dry.      Coloration: Skin is not pale.   Neurological:      Mental Status: She is alert and oriented to " person, place, and time.   Psychiatric:         Thought Content: Thought content normal.           Assessment and Plan    Diagnoses and all orders for this visit:    1. Diarrhea, unspecified type (Primary)  -     Celiac Disease Panel  -     TSH Rfx On Abnormal To Free T4  -     Breath Hydrogen Test; Future  -     Clostridioides difficile Toxin, PCR - Stool, Per Rectum    2. Irritable bowel syndrome with diarrhea    3. Bloating    4. Abdominal cramping    5. History of Clostridioides difficile infection    Other orders  -     colestipol (COLESTID) 1 g tablet; Take 1 tablet by mouth 2 (Two) Times a Day.  Dispense: 60 tablet; Refill: 5         Discussion  Patient presents today for evaluation with concerns about worsening diarrhea associated with cramping and bloating.  She has a history of C. difficile and irritable bowel syndrome.  I will check lab work today to check for thyroid and to evaluate for celiac disease.  Recommended stool study to assess for C. difficile.  Discussed consideration for possible small intestinal bacterial overgrowth as contributing factor to her symptoms and will arrange for testing to be completed.    If symptoms persist, may consider updated colonoscopy with rebiopsy of colon to evaluate for colitis.  May also consider updated CT scan.    While testing has been completed, we will proceed with trial of colestipol.  If testing negative, could consider Lotronex or Viberzi.  Could also consider Xifaxan however we will hold off on this at this time due to her history of C. difficile.          Follow Up   Return for Follow up to review results after testing complete.    Patient Instructions   Check lab work to check the thyroid and to check for celiac disease.    Submit stool sample to check for C. difficile.    Schedule hydrogen breath test to assess for small intestinal bacterial overgrowth.     For diarrhea, start colestipol.  Prescription sent to pharmacy.

## 2024-10-31 ENCOUNTER — LAB (OUTPATIENT)
Dept: LAB | Facility: HOSPITAL | Age: 64
End: 2024-10-31
Payer: COMMERCIAL

## 2024-10-31 LAB
ENDOMYSIUM IGA SER QL: NEGATIVE
IGA SERPL-MCNC: 92 MG/DL (ref 87–352)
TTG IGA SER-ACNC: <2 U/ML (ref 0–3)

## 2024-10-31 PROCEDURE — 87493 C DIFF AMPLIFIED PROBE: CPT | Performed by: NURSE PRACTITIONER

## 2024-11-01 ENCOUNTER — PATIENT ROUNDING (BHMG ONLY) (OUTPATIENT)
Dept: GASTROENTEROLOGY | Facility: CLINIC | Age: 64
End: 2024-11-01
Payer: COMMERCIAL

## 2024-11-01 LAB — C DIFF TOX GENS STL QL NAA+PROBE: NEGATIVE

## 2024-11-02 NOTE — PROGRESS NOTES
[facility]  [address] [address] [phone].        Tell me about your visit with us. What things went well?  Beni welcome message sent to patient.        We're always looking for ways to make our patients' experiences even better. Do you have recommendations on ways we may improve?      Overall were you satisfied with your first visit to our practice?        I appreciate you taking the time to speak with me today. Is there anything else I can do for you?       Thank you, and have a great day.

## 2024-11-18 ENCOUNTER — PATIENT MESSAGE (OUTPATIENT)
Dept: GASTROENTEROLOGY | Facility: CLINIC | Age: 64
End: 2024-11-18
Payer: COMMERCIAL

## 2024-11-19 RX ORDER — CHOLESTYRAMINE 4 G/9G
1 POWDER, FOR SUSPENSION ORAL DAILY
Qty: 30 PACKET | Refills: 0 | Status: SHIPPED | OUTPATIENT
Start: 2024-11-19

## 2024-11-22 ENCOUNTER — PATIENT MESSAGE (OUTPATIENT)
Dept: GASTROENTEROLOGY | Facility: CLINIC | Age: 64
End: 2024-11-22
Payer: COMMERCIAL

## 2024-11-26 RX ORDER — PANTOPRAZOLE SODIUM 40 MG/1
40 TABLET, DELAYED RELEASE ORAL DAILY
Qty: 30 TABLET | Refills: 5 | Status: SHIPPED | OUTPATIENT
Start: 2024-11-26

## 2024-11-27 DIAGNOSIS — R19.7 DIARRHEA, UNSPECIFIED TYPE: ICD-10-CM

## 2024-12-10 ENCOUNTER — PATIENT MESSAGE (OUTPATIENT)
Dept: GASTROENTEROLOGY | Facility: CLINIC | Age: 64
End: 2024-12-10
Payer: COMMERCIAL

## 2024-12-11 RX ORDER — FLUCONAZOLE 150 MG/1
150 TABLET ORAL ONCE
Qty: 1 TABLET | Refills: 0 | Status: SHIPPED | OUTPATIENT
Start: 2024-12-11 | End: 2024-12-11

## 2025-01-10 ENCOUNTER — PATIENT MESSAGE (OUTPATIENT)
Dept: GASTROENTEROLOGY | Facility: CLINIC | Age: 65
End: 2025-01-10
Payer: COMMERCIAL

## 2025-01-20 ENCOUNTER — PATIENT MESSAGE (OUTPATIENT)
Dept: GASTROENTEROLOGY | Facility: CLINIC | Age: 65
End: 2025-01-20
Payer: COMMERCIAL

## 2025-01-20 DIAGNOSIS — B37.31 VAGINAL CANDIDA: Primary | ICD-10-CM

## 2025-01-22 RX ORDER — FLUCONAZOLE 150 MG/1
150 TABLET ORAL ONCE
Qty: 1 TABLET | Refills: 0 | Status: SHIPPED | OUTPATIENT
Start: 2025-01-22 | End: 2025-01-22

## 2025-01-30 ENCOUNTER — TRANSCRIBE ORDERS (OUTPATIENT)
Dept: ADMINISTRATIVE | Facility: HOSPITAL | Age: 65
End: 2025-01-30
Payer: COMMERCIAL

## 2025-01-30 DIAGNOSIS — Z12.31 SCREENING MAMMOGRAM, ENCOUNTER FOR: Primary | ICD-10-CM

## 2025-04-01 NOTE — PROGRESS NOTES
"Chief Complaint   Patient presents with    Constipation         History of Present Illness  Patient is a 65-year-old female who presents today for Follow-up.  She was seen in the office October 2024 for irritable bowel syndrome.  She also has a history of C. difficile.  She had colonoscopy in 2021 with biopsies negative for microscopic colitis.  She was given prescription for colestipol and then cholestyramine to try.  She had a hydrogen breath test that was indicative of small intestinal bacterial overgrowth.  She was treated with Xifaxan.    Patient presents today for follow-up.  She completed 2 treatment courses of Xifaxan which she did feel helped her symptoms.  She continues to struggle with intermittent diarrhea but the frequency has overall decreased.  Last episode was around 2 weeks ago and previous episode was around a month before that.    She has been more constipated.  She is going up to 4 to 5 days without having a bowel movement.  She has tried MiraLAX and stool softeners which helps somewhat.  Associated with the constipation she has noted bloating, generalized abdominal discomfort, and tenesmus.  She previously been told that she has a rectocele and questions what can be done for this.  Denies any blood in the stool.    For constipation she has previously tried stool softeners, MiraLAX.  She tried Amitiza, Linzess, and Trulance all of which gave her diarrhea.     Result Review :       TSH Rfx On Abnormal To Free T4 (10/30/2024 12:19)    Celiac Disease Panel (10/30/2024 12:19)    Clostridioides difficile Toxin, PCR - Stool, Per Rectum (10/31/2024 12:00)    Breath Hydrogen Test (11/21/2024)    Office Visit with Bethany Jackman APRN (10/30/2024)    SCANNED PATHOLOGY (01/29/2021)    SCANNED - COLONOSCOPY (01/29/2021)    CT Abdomen Pelvis With Contrast (04/23/2021 19:35)    Vital Signs:   /70   Pulse 68   Temp 97.7 °F (36.5 °C)   Ht 152.4 cm (60\")   Wt 58 kg (127 lb 12.8 oz)   SpO2 97%   BMI " 24.96 kg/m²     Body mass index is 24.96 kg/m².     Physical Exam  Vitals reviewed.   Constitutional:       General: She is not in acute distress.     Appearance: She is well-developed.   HENT:      Head: Normocephalic and atraumatic.   Pulmonary:      Effort: Pulmonary effort is normal. No respiratory distress.   Abdominal:      General: Abdomen is flat. Bowel sounds are normal. There is no distension.      Palpations: Abdomen is soft.      Tenderness: There is no abdominal tenderness.   Skin:     General: Skin is dry.      Coloration: Skin is not pale.   Neurological:      Mental Status: She is alert and oriented to person, place, and time.   Psychiatric:         Thought Content: Thought content normal.           Assessment and Plan    Diagnoses and all orders for this visit:    1. Irritable bowel syndrome with both constipation and diarrhea (Primary)    2. Small intestinal bacterial overgrowth (SIBO)    3. Rectocele         Discussion  Patient presents today for follow-up.  Discussed with her today feel ongoing symptoms are consistent with irritable bowel syndrome.  With alternating bowel pattern, this can be more difficult to treat.  Recommended trial of daily fiber supplement to help regulate bowels which also should hopefully help her evacuate better with her rectocele.  We also discussed that is okay to use stool softeners and MiraLAX as needed.  We discussed trial of squatty potty and splinting the posterior vaginal wall to help her eliminate with rectocele.  Discussed if she is interested we could consider physical therapy referral for pelvic floor PT or could consider referral to urogynecology.    Regarding SIBO, overall symptoms improved after treatment.  Do not feel any further antibiotic treatment needed at this time.  If symptoms worsen can consider retreatment with Xifaxan.    If fiber does not help, we could consider trial of Ibsrela however I am concerned that this would also cause diarrhea due to  her mixed bowel pattern.          Follow Up   Return if symptoms worsen or fail to improve.    Patient Instructions   Recommend starting a daily fiber supplement such as Citrucel, Metamucil, or Benefiber available over-the-counter.     OK to continue daily stool softener. Recommend Colace (generic docusate).

## 2025-04-03 ENCOUNTER — OFFICE VISIT (OUTPATIENT)
Dept: GASTROENTEROLOGY | Facility: CLINIC | Age: 65
End: 2025-04-03
Payer: COMMERCIAL

## 2025-04-03 VITALS
BODY MASS INDEX: 25.09 KG/M2 | OXYGEN SATURATION: 97 % | DIASTOLIC BLOOD PRESSURE: 70 MMHG | TEMPERATURE: 97.7 F | HEART RATE: 68 BPM | SYSTOLIC BLOOD PRESSURE: 128 MMHG | HEIGHT: 60 IN | WEIGHT: 127.8 LBS

## 2025-04-03 DIAGNOSIS — K58.2 IRRITABLE BOWEL SYNDROME WITH BOTH CONSTIPATION AND DIARRHEA: Primary | ICD-10-CM

## 2025-04-03 DIAGNOSIS — N81.6 RECTOCELE: ICD-10-CM

## 2025-04-03 DIAGNOSIS — K63.8219 SMALL INTESTINAL BACTERIAL OVERGROWTH (SIBO): ICD-10-CM

## 2025-04-03 PROCEDURE — 99214 OFFICE O/P EST MOD 30 MIN: CPT | Performed by: NURSE PRACTITIONER

## 2025-04-03 RX ORDER — BISACODYL 5 MG/1
5 TABLET, DELAYED RELEASE ORAL DAILY PRN
COMMUNITY

## 2025-04-03 RX ORDER — ACETAMINOPHEN 500 MG
TABLET ORAL
COMMUNITY

## 2025-04-03 NOTE — PATIENT INSTRUCTIONS
Recommend starting a daily fiber supplement such as Citrucel, Metamucil, or Benefiber available over-the-counter.     OK to continue daily stool softener. Recommend Colace (generic docusate).

## 2025-04-25 ENCOUNTER — HOSPITAL ENCOUNTER (OUTPATIENT)
Dept: MAMMOGRAPHY | Facility: HOSPITAL | Age: 65
Discharge: HOME | End: 2025-04-25
Admitting: OBSTETRICS & GYNECOLOGY
Payer: COMMERCIAL

## 2025-04-25 DIAGNOSIS — Z12.31 SCREENING MAMMOGRAM, ENCOUNTER FOR: ICD-10-CM

## 2025-04-25 PROCEDURE — 77063 BREAST TOMOSYNTHESIS BI: CPT

## 2025-04-25 PROCEDURE — 77067 SCR MAMMO BI INCL CAD: CPT

## 2025-05-23 ENCOUNTER — TRANSCRIBE ORDERS (OUTPATIENT)
Dept: ADMINISTRATIVE | Facility: HOSPITAL | Age: 65
End: 2025-05-23
Payer: COMMERCIAL

## 2025-05-23 DIAGNOSIS — Z78.0 POST-MENOPAUSAL: Primary | ICD-10-CM

## 2025-06-09 RX ORDER — PANTOPRAZOLE SODIUM 40 MG/1
40 TABLET, DELAYED RELEASE ORAL DAILY
Qty: 30 TABLET | Refills: 5 | Status: SHIPPED | OUTPATIENT
Start: 2025-06-09

## 2025-06-19 ENCOUNTER — HOSPITAL ENCOUNTER (OUTPATIENT)
Dept: BONE DENSITY | Facility: HOSPITAL | Age: 65
Discharge: HOME | End: 2025-06-19
Admitting: NURSE PRACTITIONER
Payer: COMMERCIAL

## 2025-06-19 DIAGNOSIS — Z78.0 POST-MENOPAUSAL: ICD-10-CM

## 2025-06-19 PROCEDURE — 77080 DXA BONE DENSITY AXIAL: CPT

## 2025-07-31 RX ORDER — HYOSCYAMINE SULFATE 0.12 MG/1
0.12 TABLET SUBLINGUAL EVERY 6 HOURS PRN
Qty: 120 TABLET | Refills: 5 | Status: SHIPPED | OUTPATIENT
Start: 2025-07-31

## 2025-08-22 ENCOUNTER — TRANSCRIBE ORDERS (OUTPATIENT)
Dept: ADMINISTRATIVE | Facility: HOSPITAL | Age: 65
End: 2025-08-22
Payer: COMMERCIAL

## 2025-08-22 ENCOUNTER — LAB (OUTPATIENT)
Facility: HOSPITAL | Age: 65
End: 2025-08-22
Payer: COMMERCIAL

## 2025-08-22 ENCOUNTER — HOSPITAL ENCOUNTER (OUTPATIENT)
Facility: HOSPITAL | Age: 65
Discharge: HOME | End: 2025-08-22
Payer: COMMERCIAL

## 2025-08-22 DIAGNOSIS — N39.3 FEMALE STRESS INCONTINENCE: ICD-10-CM

## 2025-08-22 DIAGNOSIS — N39.3 FEMALE STRESS INCONTINENCE: Primary | ICD-10-CM

## 2025-08-22 LAB
QT INTERVAL: 394 MS
QTC INTERVAL: 397 MS

## 2025-08-22 PROCEDURE — 93005 ELECTROCARDIOGRAM TRACING: CPT | Performed by: NURSE PRACTITIONER
